# Patient Record
Sex: FEMALE | Race: WHITE | Employment: UNEMPLOYED | URBAN - METROPOLITAN AREA
[De-identification: names, ages, dates, MRNs, and addresses within clinical notes are randomized per-mention and may not be internally consistent; named-entity substitution may affect disease eponyms.]

---

## 2017-10-02 ENCOUNTER — OFFICE VISIT (OUTPATIENT)
Dept: FAMILY MEDICINE CLINIC | Facility: CLINIC | Age: 22
End: 2017-10-02

## 2017-10-02 VITALS
HEART RATE: 80 BPM | DIASTOLIC BLOOD PRESSURE: 60 MMHG | BODY MASS INDEX: 20.37 KG/M2 | HEIGHT: 63 IN | WEIGHT: 115 LBS | TEMPERATURE: 98 F | RESPIRATION RATE: 16 BRPM | SYSTOLIC BLOOD PRESSURE: 118 MMHG

## 2017-10-02 DIAGNOSIS — J01.10 ACUTE NON-RECURRENT FRONTAL SINUSITIS: ICD-10-CM

## 2017-10-02 DIAGNOSIS — J34.89 SINUS PRESSURE: Primary | ICD-10-CM

## 2017-10-02 PROCEDURE — 99213 OFFICE O/P EST LOW 20 MIN: CPT | Performed by: INTERNAL MEDICINE

## 2017-10-02 RX ORDER — AZITHROMYCIN 200 MG/5ML
250 POWDER, FOR SUSPENSION ORAL DAILY
Qty: 50 ML | Refills: 0 | Status: SHIPPED | OUTPATIENT
Start: 2017-10-02 | End: 2017-10-09

## 2017-10-02 RX ORDER — IPRATROPIUM BROMIDE 42 UG/1
2 SPRAY, METERED NASAL 4 TIMES DAILY
Qty: 15 ML | Refills: 0 | Status: SHIPPED | OUTPATIENT
Start: 2017-10-02 | End: 2018-02-27

## 2017-10-02 RX ORDER — LORATADINE AND PSEUDOEPHEDRINE 10; 240 MG/1; MG/1
1 TABLET, EXTENDED RELEASE ORAL DAILY
Qty: 10 TABLET | Refills: 1 | Status: SHIPPED
Start: 2017-10-02 | End: 2018-02-27

## 2017-10-02 NOTE — PROGRESS NOTES
[facility] Internal Medicine Office Note  Chief Complaint:   Patient presents with:   Body ache and/or chills  Ear Pain  Sinus Problem  Cough      HPI:   This is a 23year old female coming in for  HPI  Fiance sick, now cough body aches sore throa reviewed. Appears stated age, well groomed. With brown hair  Physical Exam    Constitutional: She appears well-developed and well-nourished. HENT:   Post nasal drip, +cobblestoning and TM bulging     Cardiovascular: Normal rate. No murmur heard.   Pul Patient verbalizes understanding. Patient is notified to call with any questions, complications, allergies, or worsening or changing symptoms. Patient is to call with any side effects or complications from the treatments as a result of today.    Patient Act

## 2017-10-27 NOTE — TELEPHONE ENCOUNTER
Requesting paroxetine   LOV: 10/2/17 (acute)  Last non acute: 5/2/17  RTC: 6 mo   Last Labs: n/a   Filled: 5/2/17 #90 with 1 refills    No future appointments. Patient is due for OV can we reach out to patient to schedule.

## 2017-11-03 RX ORDER — PAROXETINE 10 MG/1
TABLET, FILM COATED ORAL
Qty: 30 TABLET | Refills: 0 | Status: SHIPPED | OUTPATIENT
Start: 2017-11-03 | End: 2017-11-08

## 2017-11-08 NOTE — PATIENT INSTRUCTIONS
Thank you for choosing Aravind Mchugh PA-C at [place] [address]  To Do: Jessa Lau  1. Begin medications as prescribed  2. Continue medications as prescribed  3.  Follow-up in 6 months, sooner if problems  Effective 6/19/17 until November 2017  Du today.  All therapies have potential risk of harm or side effects or medication interactions.  It is your duty and for your safety to discuss with the pharmacist and our office with questions, and to notify us and stop treatment if problems arise, but know

## 2017-11-08 NOTE — PROGRESS NOTES
[facility] Internal Medicine Progress Note    CC:  Patient presents with:  Medication Follow-Up      HPI:   Medication Follow-Up   Associated symptoms include congestion and coughing.  Pertinent negatives include no chest pain, chills, fever, naus Constitutional: She is oriented to person, place, and time and well-developed, well-nourished, and in no distress. HENT:   Nose: Nose normal.   Mouth/Throat: Oropharynx is clear and moist. No oropharyngeal exudate.    + clear PND  + serous fluid behind Consults:  None     Patient/Caregiver Education: Patient/Caregiver Education: There are no barriers to learning. Medical education done. Outcome: Patient verbalizes understanding.     Problem List:  Patient Active Problem List:     PAMELA (generalized anxiety

## 2018-02-27 ENCOUNTER — OFFICE VISIT (OUTPATIENT)
Dept: FAMILY MEDICINE CLINIC | Facility: CLINIC | Age: 23
End: 2018-02-27

## 2018-02-27 VITALS
HEIGHT: 64 IN | SYSTOLIC BLOOD PRESSURE: 126 MMHG | OXYGEN SATURATION: 98 % | WEIGHT: 124 LBS | TEMPERATURE: 99 F | HEART RATE: 88 BPM | BODY MASS INDEX: 21.17 KG/M2 | RESPIRATION RATE: 18 BRPM | DIASTOLIC BLOOD PRESSURE: 82 MMHG

## 2018-02-27 DIAGNOSIS — H69.82 DYSFUNCTION OF LEFT EUSTACHIAN TUBE: ICD-10-CM

## 2018-02-27 DIAGNOSIS — H60.502 ACUTE OTITIS EXTERNA OF LEFT EAR, UNSPECIFIED TYPE: Primary | ICD-10-CM

## 2018-02-27 PROCEDURE — 99213 OFFICE O/P EST LOW 20 MIN: CPT | Performed by: FAMILY MEDICINE

## 2018-02-27 RX ORDER — NEOMYCIN SULFATE, POLYMYXIN B SULFATE, HYDROCORTISONE 3.5; 10000; 1 MG/ML; [USP'U]/ML; MG/ML
SOLUTION/ DROPS AURICULAR (OTIC)
Qty: 10 ML | Refills: 0 | Status: SHIPPED | OUTPATIENT
Start: 2018-02-27 | End: 2018-03-27

## 2018-02-27 RX ORDER — MOMETASONE 50 UG/1
2 SPRAY, METERED NASAL DAILY
Qty: 17 G | Refills: 0 | Status: SHIPPED | OUTPATIENT
Start: 2018-02-27 | End: 2018-03-27

## 2018-02-28 NOTE — PROGRESS NOTES
Danyel Oliver is a 23year old female. S:  Patient presents today with the following concerns:  · Ear pain for 2 days in the left ear. Before this had fluid type feeling-when would move her head she felt a \"sloshing\" in the ear.   Thought maybe it wa ear canal with mild swelling. No erythema. TM slightly dull but no erythema. There is tenderness on insertion of the otoscope speculum and tenderness on palpation of the tragus. Pharynx is clear. Nasal turbinates are clear.   EYES:PERRLA, EOMI  NECK: s

## 2018-02-28 NOTE — PATIENT INSTRUCTIONS
Start taking Claritin 10 mg once daily. External Ear Infection (Adult)    External otitis (also called “swimmer’s ear”) is an infection in the ear canal. It is often caused by bacteria or fungus.  It can occur a few days after water gets trapped in the e provider right away if any of these occur:  · Ear pain becomes worse or doesn’t improve after 3 days of treatment  · Redness or swelling of the outer ear occurs or gets worse  · Headache  · Painful or stiff neck  · Drowsiness or confusion  · Fever of 100.4 middle ear fluid can become infected, it is important to watch for signs of an ear infection which may develop later. These signs include increased ear pain, fever, or drainage from the ear.   Home care  The following guidelines will help you care for yours

## 2018-03-02 ENCOUNTER — TELEPHONE (OUTPATIENT)
Dept: FAMILY MEDICINE CLINIC | Facility: CLINIC | Age: 23
End: 2018-03-02

## 2018-03-02 DIAGNOSIS — Z01.00 ENCOUNTER FOR ROUTINE EYE AND VISION EXAMINATION: Primary | ICD-10-CM

## 2018-03-02 NOTE — TELEPHONE ENCOUNTER
Patient is trying to get eye exam with optometrist Dr. Mary Alice Delgado    Requesting Referral  LOV: 11/8/17  No future appointments. Patient last saw you and we have not referred there before.   Will you sign please

## 2018-03-02 NOTE — TELEPHONE ENCOUNTER
Contacted pt and informed of referral being placed, pt will contact Dr Ira Bauman to schedule appt, advised that referral dept will contact the pt when approved by insurance

## 2018-03-27 ENCOUNTER — OFFICE VISIT (OUTPATIENT)
Dept: FAMILY MEDICINE CLINIC | Facility: CLINIC | Age: 23
End: 2018-03-27

## 2018-03-27 VITALS
WEIGHT: 124.13 LBS | RESPIRATION RATE: 16 BRPM | HEART RATE: 72 BPM | DIASTOLIC BLOOD PRESSURE: 62 MMHG | BODY MASS INDEX: 21.99 KG/M2 | SYSTOLIC BLOOD PRESSURE: 120 MMHG | HEIGHT: 63 IN | TEMPERATURE: 98 F

## 2018-03-27 DIAGNOSIS — Z76.89 ENCOUNTER TO ESTABLISH CARE WITH NEW DOCTOR: Primary | ICD-10-CM

## 2018-03-27 DIAGNOSIS — Z30.09 CONTRACEPTIVE EDUCATION: ICD-10-CM

## 2018-03-27 DIAGNOSIS — F41.1 GAD (GENERALIZED ANXIETY DISORDER): ICD-10-CM

## 2018-03-27 DIAGNOSIS — R35.0 URINARY FREQUENCY: ICD-10-CM

## 2018-03-27 DIAGNOSIS — Z13.31 NEGATIVE DEPRESSION SCREENING: ICD-10-CM

## 2018-03-27 LAB
APPEARANCE: CLEAR
CONTROL LINE PRESENT WITH A CLEAR BACKGROUND (YES/NO): YES YES/NO
MULTISTIX LOT#: NORMAL NUMERIC
PH, URINE: 5.5 (ref 4.5–8)
PREGNANCY TEST, URINE: NEGATIVE
SPECIFIC GRAVITY: 1.03 (ref 1–1.03)
URINE-COLOR: YELLOW
UROBILINOGEN,SEMI-QN: 1 MG/DL (ref 0–1.9)

## 2018-03-27 PROCEDURE — 87086 URINE CULTURE/COLONY COUNT: CPT | Performed by: FAMILY MEDICINE

## 2018-03-27 PROCEDURE — 87088 URINE BACTERIA CULTURE: CPT | Performed by: FAMILY MEDICINE

## 2018-03-27 PROCEDURE — 81003 URINALYSIS AUTO W/O SCOPE: CPT | Performed by: FAMILY MEDICINE

## 2018-03-27 PROCEDURE — 81025 URINE PREGNANCY TEST: CPT | Performed by: FAMILY MEDICINE

## 2018-03-27 PROCEDURE — 99214 OFFICE O/P EST MOD 30 MIN: CPT | Performed by: FAMILY MEDICINE

## 2018-03-27 PROCEDURE — 87186 SC STD MICRODIL/AGAR DIL: CPT | Performed by: FAMILY MEDICINE

## 2018-03-27 NOTE — PROGRESS NOTES
HPI:   Lauri Jiang is a 23year old female that presents to establish care with new PCP. Hx of anxiety. Symptoms are currently well-controlled on paroxetine daily and clonazepam nightly as needed. Patient does take the clonazepam most nights. 21.99 kg/m² as calculated from the following:    Height as of this encounter: 63\". Weight as of this encounter: 124 lb 2 oz. Vital signs reviewed. Appears stated age, well groomed.   Physical Exam:  GEN:  Patient is alert, awake and oriented, well deve with her fiancé but not planning to become pregnant.   Using pull out method for contraception.  -Contraceptive counseling and safe sex counseling done in detail with patient  -If using pullout method would strongly recommend starting prenatal vitamin in ca

## 2018-03-28 RX ORDER — NITROFURANTOIN 25; 75 MG/1; MG/1
100 CAPSULE ORAL 2 TIMES DAILY
Qty: 14 CAPSULE | Refills: 0 | Status: SHIPPED | OUTPATIENT
Start: 2018-03-28 | End: 2018-04-04

## 2018-04-02 NOTE — PROGRESS NOTES
Received immunization report from Dr. Katy Bustillos. Immunizations have been abstracted. Initialed by MD. Nuvia Roca to scan.

## 2018-04-18 ENCOUNTER — TELEPHONE (OUTPATIENT)
Dept: FAMILY MEDICINE CLINIC | Facility: CLINIC | Age: 23
End: 2018-04-18

## 2018-04-18 NOTE — TELEPHONE ENCOUNTER
CLONAZEPAM 0.5MG TABLETS  Will file in chart as: CLONAZEPAM 0.5 MG Oral Tab  TAKE 1 TABLET BY MOUTH TWICE DAILY AS NEEDED FOR ANXIETY       Disp: 60 tablet Refills: 0    Class: Normal Start: 4/18/2018   Documented:2 years ago  Last refill: 3/25/2018    Nick

## 2018-04-19 ENCOUNTER — TELEPHONE (OUTPATIENT)
Dept: FAMILY MEDICINE CLINIC | Facility: CLINIC | Age: 23
End: 2018-04-19

## 2018-04-19 RX ORDER — CLONAZEPAM 0.5 MG/1
0.5 TABLET ORAL NIGHTLY PRN
Qty: 30 TABLET | Refills: 0 | Status: SHIPPED | OUTPATIENT
Start: 2018-04-19 | End: 2018-06-14

## 2018-04-19 RX ORDER — CLONAZEPAM 0.5 MG/1
0.5 TABLET ORAL NIGHTLY PRN
Qty: 30 TABLET | Refills: 0 | Status: CANCELLED | OUTPATIENT
Start: 2018-04-19

## 2018-04-19 NOTE — TELEPHONE ENCOUNTER
Aelx Reyes  Signed  Creation Time: 04/19/2018 2:25 PM         Pt would like a call back from a nurse in regards to refill denial , pt states she was in to  see dr Stevphen Soulier on 3/27/18 for refills why does she have to come back in .  Please advise

## 2018-04-19 NOTE — TELEPHONE ENCOUNTER
Will refill clonazepam for 30 days, she is due for annual wellness exam with pap. Please have her schedule appt.       Falguni Mccollum, DO  Family Medicine

## 2018-04-19 NOTE — TELEPHONE ENCOUNTER
Pt would like a call back from a nurse in regards to refill denial , pt states she was in to  see dr Paty Mclean on 3/27/18 for refills why does she have to come back in .  Please advise

## 2018-04-19 NOTE — TELEPHONE ENCOUNTER
Spoke to [facility] [employer] [facility] [employer] pharmacist at [facility], called in prescription for Clonazepam, pharmacists verbalized understanding with intent to inform pt when ready to be picked up.     Disp Refills Start End    ClonazePAM 0.5 MG Oral Tab 30 tablet 0 4/19/2018

## 2018-04-19 NOTE — TELEPHONE ENCOUNTER
Abril Cheek, DO   [facility] 27 minutes ago (4:27 PM)      She had visit to establish only, I will refill her paroxetine every 6 months no problem.     She is also due for her wellness exam and pap which we did discuss at appt.    Controlled

## 2018-04-19 NOTE — TELEPHONE ENCOUNTER
Attempted to reach pt, no answer, left detailed message informing pt that she is due for her annual wellness visit to please contact the office to schedule an OV. No future appointments.

## 2018-05-11 RX ORDER — PAROXETINE 10 MG/1
TABLET, FILM COATED ORAL
Qty: 90 TABLET | Refills: 0 | Status: SHIPPED | OUTPATIENT
Start: 2018-05-11 | End: 2018-07-12

## 2018-05-11 NOTE — TELEPHONE ENCOUNTER
PAROXETINE 10MG TABLETS  Will file in chart as: PAROXETINE HCL 10 MG Oral Tab  TAKE 1 TABLET BY MOUTH DAILY       Disp: 30 tablet Refills: 0    Class: Normal Start: 5/11/2018   Documented:1 year ago  Last refill: 4/17/2018  LOV:3- w/  to es

## 2018-06-12 RX ORDER — CLONAZEPAM 0.5 MG/1
TABLET ORAL
Qty: 30 TABLET | Refills: 0
Start: 2018-06-12

## 2018-06-12 NOTE — TELEPHONE ENCOUNTER
Requesting Clonazepam 0.5mg   LOV: 3/27/18  RTC: 4 wks  Last Labs: n/a  Filled: 4/19/18 #30with 0 refills    No future appointments.   Dispensed Written Strength Form Quantity Refills Days Supply Provider Pharmacy    CLONAZEPAM 04/22/2018 04/19/2018 0.5 MG

## 2018-06-14 ENCOUNTER — TELEPHONE (OUTPATIENT)
Dept: FAMILY MEDICINE CLINIC | Facility: CLINIC | Age: 23
End: 2018-06-14

## 2018-06-14 NOTE — TELEPHONE ENCOUNTER
Pt came in today for an ov and was turned away due to the wrong insurance. Pt states she was in the office on 3.27.18 and requested her clonazepam and paroxetine be refilled.  I read back to pt from her 3.27.18 that she didn't need refills on any medication

## 2018-06-14 NOTE — TELEPHONE ENCOUNTER
Requesting Clonazepam and Paroxetine (pt states that at the time of her visit she did not need a refill and stated she would call us when she needed a refill).    LOV: 3/27/18  RTC: 4 wks  Last Labs: n/a  Filled: Paroxetine 5/11/18 #90with 0 refills-should

## 2018-06-16 RX ORDER — CLONAZEPAM 0.5 MG/1
0.5 TABLET ORAL NIGHTLY PRN
Qty: 30 TABLET | Refills: 0 | Status: SHIPPED
Start: 2018-06-16 | End: 2018-07-12

## 2018-06-16 NOTE — TELEPHONE ENCOUNTER
Clonazepam refilled for 30 days. No further refills without appt.      Risa Waggoner, DO  Family Medicine

## 2018-06-18 NOTE — TELEPHONE ENCOUNTER
LM for patient Clonazepam faxed this morning to [facility] and paroxetine sent as well. Further asked patient to call back to schedule appointment as she was given #30 day only and must be seen for further. No future appointments.

## 2018-06-27 ENCOUNTER — TELEPHONE (OUTPATIENT)
Dept: FAMILY MEDICINE CLINIC | Facility: CLINIC | Age: 23
End: 2018-06-27

## 2018-07-12 ENCOUNTER — OFFICE VISIT (OUTPATIENT)
Dept: FAMILY MEDICINE CLINIC | Facility: CLINIC | Age: 23
End: 2018-07-12

## 2018-07-12 VITALS
BODY MASS INDEX: 21.97 KG/M2 | DIASTOLIC BLOOD PRESSURE: 76 MMHG | WEIGHT: 124 LBS | SYSTOLIC BLOOD PRESSURE: 112 MMHG | HEART RATE: 76 BPM | HEIGHT: 63 IN | OXYGEN SATURATION: 99 % | RESPIRATION RATE: 16 BRPM

## 2018-07-12 DIAGNOSIS — Z00.00 ROUTINE GENERAL MEDICAL EXAMINATION AT A HEALTH CARE FACILITY: Primary | ICD-10-CM

## 2018-07-12 DIAGNOSIS — F41.1 GAD (GENERALIZED ANXIETY DISORDER): ICD-10-CM

## 2018-07-12 DIAGNOSIS — F41.0 PANIC ATTACKS: ICD-10-CM

## 2018-07-12 PROCEDURE — 99395 PREV VISIT EST AGE 18-39: CPT | Performed by: PHYSICIAN ASSISTANT

## 2018-07-12 PROCEDURE — G0438 PPPS, INITIAL VISIT: HCPCS | Performed by: PHYSICIAN ASSISTANT

## 2018-07-12 RX ORDER — PAROXETINE 10 MG/1
10 TABLET, FILM COATED ORAL
Qty: 90 TABLET | Refills: 0 | Status: SHIPPED | OUTPATIENT
Start: 2018-07-12 | End: 2018-07-12 | Stop reason: CLARIF

## 2018-07-12 RX ORDER — PAROXETINE 10 MG/1
10 TABLET, FILM COATED ORAL
Qty: 90 TABLET | Refills: 3 | Status: SHIPPED | OUTPATIENT
Start: 2018-07-12 | End: 2019-01-11

## 2018-07-12 RX ORDER — CLONAZEPAM 0.5 MG/1
0.5 TABLET ORAL NIGHTLY PRN
Qty: 60 TABLET | Refills: 0 | Status: SHIPPED | OUTPATIENT
Start: 2018-07-12 | End: 2019-01-11

## 2018-07-12 RX ORDER — CLONAZEPAM 0.5 MG/1
0.5 TABLET ORAL 2 TIMES DAILY PRN
Qty: 60 TABLET | Refills: 3 | Status: SHIPPED | OUTPATIENT
Start: 2018-07-12 | End: 2019-01-11

## 2018-07-12 NOTE — PROGRESS NOTES
REASON FOR VISIT:    Jazmin Moon is a 23year old female who presents for an [address]     PAMELA/panic attacks  Pt is doing well on paroxetine and clonazepam  Mood is good  She states it took a good year to feel completely fine, but now she screening varies with age, risk and gender No results found for: LDL, LDLC   Diabetes Screening  If history of high blood pressure or other  risk factors No results found for: A1C  No results found for: GLUCOSE, GLU     Gonorrhea Screening If high risk No denies abdominal pain, denies heartburn  : denies dysuria, vaginal discharge or itching, periods regular   MUSCULOSKELETAL: denies back pain  NEURO: denies headaches  PSYCHE: denies depression, + anxiety + panic attacks  HEMATOLOGIC: denies hx of anemia good  Will continue    Panic attacks  Pt is doing well on clonazepam nightly and sometimes in the morning  She states this is much better for her than the xanax was  Will continue    Other orders  -     ClonazePAM 0.5 MG Oral Tab;  Take 1 tablet (0.5 mg tot

## 2018-07-12 NOTE — PATIENT INSTRUCTIONS
Thank you for choosing Manuel Potter PA-C at [place] [address]  To Do: Bartolo Benjamin  1. Pt to continue medications as prescribed  2. Get labs done  3.  Follow-up in 6 months for medication follow-up and pap smear, sooner if problems    • Please sig informing you that the insurance company approved your testing, please call Central Scheduling at [phone]  Please allow our office 5 business days to contact you regarding any testing results.     Refill policies:   Allow 3 business days for refills; c

## 2018-07-23 ENCOUNTER — TELEPHONE (OUTPATIENT)
Dept: FAMILY MEDICINE CLINIC | Facility: CLINIC | Age: 23
End: 2018-07-23

## 2018-07-23 NOTE — TELEPHONE ENCOUNTER
Pt says she has a question about her recent visit on 7/12/2018. That is all she said. Please call her back. .. Thanks!

## 2018-07-24 NOTE — TELEPHONE ENCOUNTER
Patient states she needs a note to excuse her for her office visit on 7/12/18 as well as the day before because she took off of work accidentally and didn't realize her appointment wasn't until the 12th. Pended in communications if okay.

## 2018-08-22 ENCOUNTER — TELEPHONE (OUTPATIENT)
Dept: FAMILY MEDICINE CLINIC | Facility: CLINIC | Age: 23
End: 2018-08-22

## 2018-08-22 NOTE — TELEPHONE ENCOUNTER
Patient informed okay for note. She will print off my chart. She needs to be off today and tomorrow and will return to work on Friday. Note done.

## 2018-08-22 NOTE — TELEPHONE ENCOUNTER
Patient states she has dosing questions regarding medications. She was told she could talk to Jovanny Sender at the last visit. She would prefer to talk to her, would give me no further information.  I told her I wasn't sure if a general message such as that would
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
## 2018-08-24 ENCOUNTER — TELEPHONE (OUTPATIENT)
Dept: FAMILY MEDICINE CLINIC | Facility: CLINIC | Age: 23
End: 2018-08-24

## 2018-08-24 NOTE — TELEPHONE ENCOUNTER
Pt said her parents will not be home until later tonight and she still has high anxiety and does not want to leave her home. She is asking to extend her note to be off work through today. I have pended the note if you are okay with approving it?   Note ap

## 2018-08-24 NOTE — TELEPHONE ENCOUNTER
Pt states that she spoke to the nurse a day or so ago and she needs to give additional info to update ppw

## 2018-09-12 ENCOUNTER — OFFICE VISIT (OUTPATIENT)
Dept: FAMILY MEDICINE CLINIC | Facility: CLINIC | Age: 23
End: 2018-09-12

## 2018-09-12 VITALS
TEMPERATURE: 98 F | RESPIRATION RATE: 16 BRPM | SYSTOLIC BLOOD PRESSURE: 120 MMHG | DIASTOLIC BLOOD PRESSURE: 72 MMHG | HEART RATE: 70 BPM | OXYGEN SATURATION: 98 % | HEIGHT: 63 IN | WEIGHT: 125 LBS | BODY MASS INDEX: 22.15 KG/M2

## 2018-09-12 DIAGNOSIS — G44.319 ACUTE POST-TRAUMATIC HEADACHE, NOT INTRACTABLE: ICD-10-CM

## 2018-09-12 DIAGNOSIS — M62.838 MUSCLE SPASMS OF NECK: Primary | ICD-10-CM

## 2018-09-12 DIAGNOSIS — M25.572 ACUTE LEFT ANKLE PAIN: ICD-10-CM

## 2018-09-12 PROCEDURE — 99213 OFFICE O/P EST LOW 20 MIN: CPT | Performed by: PHYSICIAN ASSISTANT

## 2018-09-12 NOTE — PROGRESS NOTES
CHIEF COMPLAINT:   Patient presents with:  Neck Pain    HPI:   Geovanna Gray is a 23year old female who is here for complaints of neck pain after falling down stairs yesterday. Pain is left sided and feels \"crampy,\" it is mild and does not radiate.  Pa EXTREMITIES: no cyanosis, clubbing or edema, non tender to palpation w/ normal ROM w/o pain. Left ankle non edematous, no ecchymosis, joint laxity, or pain with ROM. Active ROM intact.     NECK/NEURO: diffusely DTR's are 2+ bilaterally, strength is 5+/5, Muscle spasms often come and go quickly. When a muscle goes into spasm, very gently stretch and massage the muscle. This may help calm the muscle fibers. Then rest the muscle.   Preventing muscle spasms  Although there is little or no evidence that staying

## 2018-09-12 NOTE — PATIENT INSTRUCTIONS
Muscle Spasm  A muscle spasm (also called a cramp) is an involuntary muscle contraction. The muscle tightens quickly and strongly. A hard lump may form in the muscle. Muscle spasms are very painful.  Read on to learn more about muscle spasms and how to tr © 9301-5347 [employer] [address]. [address] [facility], [facility] [place]. All rights reserved. This information is not intended as a substitute for professional medical care. Always follow your healthcare professional's instructions.

## 2018-10-18 RX ORDER — PAROXETINE 10 MG/1
TABLET, FILM COATED ORAL
Qty: 90 TABLET | Refills: 0 | OUTPATIENT
Start: 2018-10-18

## 2018-10-18 NOTE — TELEPHONE ENCOUNTER
Requesting Paroxetine  LOV: 7/12/18  RTC: 6 months  Last Relevant Labs: n/a  Filled: 7/12/18 #90 with 3 refills    No future appointments.     One year sent in July and receipt is confirmed by pharmacy requesting this - denied with note as such

## 2018-10-18 NOTE — TELEPHONE ENCOUNTER
Requesting Paroxetine  LOV: 7/12/18  RTC: 6 months  Last Relevant Labs: n/a  Filled: 7/12/18 #90 with 3 refills    No future appointments.     Receipt confirmed by pharmacy requesting the refill that was sent for a year in July - denied with note as such

## 2018-10-18 NOTE — TELEPHONE ENCOUNTER
Requesting Paroxetine  LOV: 7/12/18  RTC: 6 months  Last Relevant Labs: n/a  Filled: 7/12/18 #90 with 3 refills    No future appointments.     *receipt of one year RX sent in July confirmed by pharmacy requesting refill - denied with note as such

## 2019-01-11 ENCOUNTER — LAB ENCOUNTER (OUTPATIENT)
Dept: LAB | Facility: HOSPITAL | Age: 24
End: 2019-01-11
Attending: FAMILY MEDICINE
Payer: COMMERCIAL

## 2019-01-11 DIAGNOSIS — R53.83 FATIGUE, UNSPECIFIED TYPE: ICD-10-CM

## 2019-01-11 LAB
ALBUMIN SERPL-MCNC: 4.3 G/DL (ref 3.1–4.5)
ALBUMIN/GLOB SERPL: 1.3 {RATIO} (ref 1–2)
ALP LIVER SERPL-CCNC: 52 U/L (ref 37–98)
ALT SERPL-CCNC: 25 U/L (ref 14–54)
ANION GAP SERPL CALC-SCNC: 6 MMOL/L (ref 0–18)
AST SERPL-CCNC: 20 U/L (ref 15–41)
BASOPHILS # BLD AUTO: 0.01 X10(3) UL (ref 0–0.1)
BASOPHILS NFR BLD AUTO: 0.3 %
BILIRUB SERPL-MCNC: 0.5 MG/DL (ref 0.1–2)
BUN BLD-MCNC: 10 MG/DL (ref 8–20)
BUN/CREAT SERPL: 13.7 (ref 10–20)
CALCIUM BLD-MCNC: 9 MG/DL (ref 8.3–10.3)
CHLORIDE SERPL-SCNC: 108 MMOL/L (ref 101–111)
CO2 SERPL-SCNC: 26 MMOL/L (ref 22–32)
CREAT BLD-MCNC: 0.73 MG/DL (ref 0.55–1.02)
EOSINOPHIL # BLD AUTO: 0.02 X10(3) UL (ref 0–0.3)
EOSINOPHIL NFR BLD AUTO: 0.6 %
ERYTHROCYTE [DISTWIDTH] IN BLOOD BY AUTOMATED COUNT: 12.9 % (ref 11.5–16)
GLOBULIN PLAS-MCNC: 3.4 G/DL (ref 2.8–4.4)
GLUCOSE BLD-MCNC: 93 MG/DL (ref 70–99)
HAV AB SERPL IA-ACNC: 282 PG/ML (ref 193–986)
HCT VFR BLD AUTO: 44.5 % (ref 34–50)
HGB BLD-MCNC: 15 G/DL (ref 12–16)
IMMATURE GRANULOCYTE COUNT: 0.01 X10(3) UL (ref 0–1)
IMMATURE GRANULOCYTE RATIO %: 0.3 %
LYMPHOCYTES # BLD AUTO: 1.3 X10(3) UL (ref 0.9–4)
LYMPHOCYTES NFR BLD AUTO: 37.4 %
M PROTEIN MFR SERPL ELPH: 7.7 G/DL (ref 6.4–8.2)
MCH RBC QN AUTO: 28.8 PG (ref 27–33.2)
MCHC RBC AUTO-ENTMCNC: 33.7 G/DL (ref 31–37)
MCV RBC AUTO: 85.4 FL (ref 81–100)
MONOCYTES # BLD AUTO: 0.28 X10(3) UL (ref 0.1–1)
MONOCYTES NFR BLD AUTO: 8 %
NEUTROPHIL ABS PRELIM: 1.86 X10 (3) UL (ref 1.3–6.7)
NEUTROPHILS # BLD AUTO: 1.86 X10(3) UL (ref 1.3–6.7)
NEUTROPHILS NFR BLD AUTO: 53.4 %
OSMOLALITY SERPL CALC.SUM OF ELEC: 289 MOSM/KG (ref 275–295)
PLATELET # BLD AUTO: 200 10(3)UL (ref 150–450)
POTASSIUM SERPL-SCNC: 4 MMOL/L (ref 3.6–5.1)
RBC # BLD AUTO: 5.21 X10(6)UL (ref 3.8–5.1)
RED CELL DISTRIBUTION WIDTH-SD: 39.8 FL (ref 35.1–46.3)
SODIUM SERPL-SCNC: 140 MMOL/L (ref 136–144)
T4 FREE SERPL-MCNC: 1 NG/DL (ref 0.9–1.8)
TSI SER-ACNC: 1.6 MIU/ML (ref 0.35–5.5)
VIT D+METAB SERPL-MCNC: 28.6 NG/ML (ref 30–100)
WBC # BLD AUTO: 3.5 X10(3) UL (ref 4–13)

## 2019-01-11 PROCEDURE — 85025 COMPLETE CBC W/AUTO DIFF WBC: CPT

## 2019-01-11 PROCEDURE — 86376 MICROSOMAL ANTIBODY EACH: CPT

## 2019-01-11 PROCEDURE — 82607 VITAMIN B-12: CPT

## 2019-01-11 PROCEDURE — 84439 ASSAY OF FREE THYROXINE: CPT

## 2019-01-11 PROCEDURE — 80053 COMPREHEN METABOLIC PANEL: CPT

## 2019-01-11 PROCEDURE — 84443 ASSAY THYROID STIM HORMONE: CPT

## 2019-01-11 PROCEDURE — 36415 COLL VENOUS BLD VENIPUNCTURE: CPT

## 2019-01-11 PROCEDURE — 82306 VITAMIN D 25 HYDROXY: CPT

## 2019-01-11 PROCEDURE — 86800 THYROGLOBULIN ANTIBODY: CPT

## 2019-01-11 NOTE — PATIENT INSTRUCTIONS
Thank you for choosing Neda Peterson PA-C at [place] [address]  To Do: Mark Echevarria  1. Pt to continue medications  2. Pt to get lab work done  3.  Follow-up in 6 months, sooner if problems  Benzodiazepene Anxiety/Sleep Medicines are Risky  Benzodi and seizure disorders,” a [facility] recovery network representative tells me, the “possible threat of overusing them is real and with that comes dependency, overdose and the potentiality of death.” - Estela Reilly PHD [employer]  Do [employer] doct office with questions, and to notify us and stop treatment if problems arise, but know that our intention is that the benefits outweigh those potential risks and we strive to make you healthier and to improve your quality of life.     Referrals, Radiology,

## 2019-01-11 NOTE — PROGRESS NOTES
[facility] Internal Medicine Progress Note    CC:  Patient presents with:  Medication Request: clonazepam and paxil  Fatigue: x 2 months  Imm/Inj: declines flu shot      HPI:   HPI  She states she feels like she has no energy constantly   She wan Pupils are equal, round, and reactive to light. Neck: No thyromegaly present. Cardiovascular: Normal rate, regular rhythm and normal heart sounds. Exam reveals no gallop and no friction rub. No murmur heard.   Pulmonary/Chest: Effort normal and breath

## 2019-01-12 LAB
THYROGLOB SERPL-MCNC: <15 U/ML (ref ?–60)
THYROPEROXIDASE AB SERPL-ACNC: <28 U/ML (ref ?–60)

## 2019-01-14 ENCOUNTER — TELEPHONE (OUTPATIENT)
Dept: FAMILY MEDICINE CLINIC | Facility: CLINIC | Age: 24
End: 2019-01-14

## 2019-01-14 DIAGNOSIS — E55.9 VITAMIN D DEFICIENCY: Primary | ICD-10-CM

## 2019-01-14 RX ORDER — ERGOCALCIFEROL 1.25 MG/1
50000 CAPSULE ORAL WEEKLY
Qty: 4 CAPSULE | Refills: 2 | Status: SHIPPED | OUTPATIENT
Start: 2019-01-14 | End: 2019-02-13

## 2019-01-14 NOTE — PROGRESS NOTES
Vitamin B12 is low, pt can get monthly B12 injections x 6 months  Low Vit D, begin 50,000 IU weekly x 12 weeks and then OTC 2,000 IU daily  WBC is a little low, repeat CBC in 1 month

## 2019-03-24 DIAGNOSIS — E55.9 VITAMIN D DEFICIENCY: ICD-10-CM

## 2019-03-25 RX ORDER — ERGOCALCIFEROL 1.25 MG/1
CAPSULE ORAL
Qty: 4 CAPSULE | Refills: 0 | OUTPATIENT
Start: 2019-03-25

## 2019-03-25 NOTE — TELEPHONE ENCOUNTER
Notes recorded by Remedios Tiwari PA-C on 1/13/2019 at 8:55 PM CST  Vitamin B12 is low, pt can get monthly B12 injections x 6 months  Low Vit D, begin 50,000 IU weekly x 12 weeks and then OTC 2,000 IU daily  WBC is a little low, repeat CBC in 1 month

## 2019-07-13 NOTE — LETTER
Date: 10/2/2017    Patient Name: Shun Fontenot          To Whom it may concern: This letter has been written at the patient's request. The above patient was seen at the [facility] for treatment of a medical condition.     This patient ángelu [employer] spouse

## 2019-08-05 NOTE — TELEPHONE ENCOUNTER
Requesting ClonazePAM 0.5 MG Oral Tab  LOV: 1/11/19  RTC: 6 mos  Last Labs: n/a  Filled: 01/11/19#60 with 5 refills-expires after 6 mos    No future appointments.      Per pharmacy last filled: 4/22/19 #60 With 0 refills

## 2019-08-07 RX ORDER — CLONAZEPAM 0.5 MG/1
0.5 TABLET ORAL 2 TIMES DAILY PRN
Qty: 60 TABLET | Refills: 5 | Status: CANCELLED | OUTPATIENT
Start: 2019-08-07

## 2019-08-08 NOTE — TELEPHONE ENCOUNTER
Requesting Clonazepam 0.5mg  LOV: 19  RTC: 6 mos  Last Labs: n/a  Filled: 19 #60with 5 refills (RX )    No future appointments.   -Duplicate request

## 2019-08-08 NOTE — TELEPHONE ENCOUNTER
Approve/Deny set up RX. Pt returned call, scheduled follow up 8/13/19, needs refill as pt only has a few tablets left.

## 2019-08-08 NOTE — TELEPHONE ENCOUNTER
Pt is again requesting the refill request be reviewed. Last OV 1/11/19 Chantell SANTIAGO with request for 6 month follow up. LMOM for pt requesting a return call to schedule follow up.

## 2019-08-08 NOTE — TELEPHONE ENCOUNTER
Pt sent 2nd CRM message inquiring about refill. LMOM for pt requesting a return call, screening for current crisis needed with SI/HI question. Sent MyChart message to pt also.

## 2019-08-09 RX ORDER — CLONAZEPAM 0.5 MG/1
TABLET ORAL
Qty: 60 TABLET | Refills: 0 | Status: SHIPPED
Start: 2019-08-09 | End: 2019-08-13

## 2019-08-13 ENCOUNTER — OFFICE VISIT (OUTPATIENT)
Dept: FAMILY MEDICINE CLINIC | Facility: CLINIC | Age: 24
End: 2019-08-13
Payer: COMMERCIAL

## 2019-08-13 VITALS
TEMPERATURE: 98 F | RESPIRATION RATE: 16 BRPM | WEIGHT: 128 LBS | HEIGHT: 63 IN | DIASTOLIC BLOOD PRESSURE: 64 MMHG | SYSTOLIC BLOOD PRESSURE: 110 MMHG | HEART RATE: 70 BPM | BODY MASS INDEX: 22.68 KG/M2

## 2019-08-13 DIAGNOSIS — Z00.00 ANNUAL PHYSICAL EXAM: Primary | ICD-10-CM

## 2019-08-13 DIAGNOSIS — F41.1 GAD (GENERALIZED ANXIETY DISORDER): ICD-10-CM

## 2019-08-13 PROCEDURE — 99395 PREV VISIT EST AGE 18-39: CPT | Performed by: FAMILY MEDICINE

## 2019-08-13 RX ORDER — CLONAZEPAM 0.5 MG/1
TABLET ORAL
Qty: 60 TABLET | Refills: 3 | Status: SHIPPED | OUTPATIENT
Start: 2019-08-13 | End: 2020-02-26

## 2019-08-13 NOTE — PROGRESS NOTES
Geovanna Gray is a 20year old female that presents for annual physical exam.     Last Pap: none previously, declined today  Hx of abnormal pap: No  STI testing desired: No  Mammogram: N/A  Colonoscopy: N/A  PHQ2: 0  Vaccines: TDAP 8/8/08 - done 2015 at [facility] Days per week: Not on file        Minutes per session: Not on file      Stress: Not on file    Relationships      Social connections:        Talks on phone: Not on file        Gets together: Not on file        Attends [employer] service: Not on file clear  EYES: PERRLA, EOMI, conjunctiva are clear  NECK: supple, no adenopathy, no thyromegaly or palpable nodules  CHEST: no chest tenderness  LUNGS: clear to auscultation  CARDIO: RRR without murmur  GI: +bowel sounds, no masses, HSM or tenderness  MUSCUL

## 2019-08-13 NOTE — PATIENT INSTRUCTIONS
Thank you for allowing me to participate in your care today. I will contact you with any results from today's visit. Lab results are typically available in 2-3 days for blood tests, and 3-5 days for any cultures or Paps.    Please let me know if you hav prediabetes All women with no symptoms who are overweight or obese and have 1 or more other risk factors for diabetes At least every 3 years.  Also, testing for diabetes during pregnancy after the 24th week.    Type 2 diabetes, prediabetes All women diagnos months after the first dose and the third dose given 6 months after the first dose   Influenza (flu) All women in this age group Once a year   Measles, mumps, rubella (MMR) All women in this age group who have no record of these infections or vaccines 1 or not up-to-date on their childhood vaccines should get all appropriate catch-up vaccines recommended by the CDC. Date Last Reviewed: 10/1/2017  © 1414-5105 [employer] [address]. [address] [facility] [place]. All rights reserved.  This info

## 2019-09-25 ENCOUNTER — NURSE ONLY (OUTPATIENT)
Dept: FAMILY MEDICINE CLINIC | Facility: CLINIC | Age: 24
End: 2019-09-25
Payer: COMMERCIAL

## 2019-09-25 VITALS
DIASTOLIC BLOOD PRESSURE: 70 MMHG | HEART RATE: 77 BPM | HEIGHT: 64 IN | WEIGHT: 126 LBS | OXYGEN SATURATION: 98 % | BODY MASS INDEX: 21.51 KG/M2 | RESPIRATION RATE: 16 BRPM | TEMPERATURE: 98 F | SYSTOLIC BLOOD PRESSURE: 102 MMHG

## 2019-09-25 DIAGNOSIS — L03.113 CELLULITIS OF RIGHT UPPER EXTREMITY: Primary | ICD-10-CM

## 2019-09-25 PROCEDURE — 99213 OFFICE O/P EST LOW 20 MIN: CPT | Performed by: PHYSICIAN ASSISTANT

## 2019-09-25 RX ORDER — SULFAMETHOXAZOLE AND TRIMETHOPRIM 800; 160 MG/1; MG/1
1 TABLET ORAL 2 TIMES DAILY
Qty: 20 TABLET | Refills: 0 | Status: SHIPPED | OUTPATIENT
Start: 2019-09-25 | End: 2019-10-05

## 2019-09-25 NOTE — PATIENT INSTRUCTIONS
Warm compresses   Ibuprofen for pain as needed   If increased redness/swelling/fever please follow up or go to ED

## 2019-09-25 NOTE — PROGRESS NOTES
CHIEF COMPLAINT:   Patient presents with:  Swelling: redness at right arm s/s for 3 days. No OTC meds used.   tendor to touch, traveling up arm         HPI:    Jazmin Red is a 30year old female who presents for evaluation of a rash on the right arm fo +surrounding circular erythema present. No swelling/induration noted. No warmth. +TTP. Good sensation. +2 radial pulses. Normal capillary refill   HENT: Head atraumatic, normocephalic. LUNGS: Clear to auscultation bilaterally.   No wheezing, rhonchi, or ra

## 2019-10-30 ENCOUNTER — TELEPHONE (OUTPATIENT)
Dept: FAMILY MEDICINE CLINIC | Facility: CLINIC | Age: 24
End: 2019-10-30

## 2019-10-30 NOTE — TELEPHONE ENCOUNTER
+Increased anxiety at night  +Would like to increase dose for Clonazepam 0.5 mg  - Denies SI/ID  -Symptoms started 2 weeks  -Patient states she has a new job and hours are making her anxiety worse  Would like to also have an sleep aid.  -Has not tried OTC

## 2019-10-30 NOTE — TELEPHONE ENCOUNTER
Patient informed of MD recommendations below, patient verbalized understanding with intent to comply. Offered opportunity to ask questions, all questions were answered.     Future Appointments   Date Time Provider Nataly Emerson   10/31/2019 10:00 AM [place]

## 2019-10-30 NOTE — TELEPHONE ENCOUNTER
We can adjust medications tomorrow, there are few options and I would like to discuss them without just starting her on medications.

## 2019-11-04 NOTE — PROGRESS NOTES
HPI:   Michael Tolliver is a 30year old female that presents for anxiety. Currently taking daily paxil and clonazepam nightly for anxiety/sleep.   Previously taking one tab nightly but past week feels like she has needed 2 tabs of benzo for anxiety, worse at radial pulses b/l. NEURO:  A&Ox3. Mood anxious. Normal thought content and judgement, no SI/HI. ASSESSMENT AND PLAN:      1. PAMELA (generalized anxiety disorder)  - busPIRone HCl 5 MG Oral Tab;  Take 1 tablet (5 mg total) by mouth 2 (two) times rachelle

## 2019-11-04 NOTE — PATIENT INSTRUCTIONS
Continue paroxetine 10 mg daily  Add buspirone 5 mg twice a day for anxiety  Clonazepam as needed for panic attacks  Follow up if not improving      Panic Attack  A panic attack is an extreme fear reaction that comes on for no clear reason.  There is often you to handle. · Notice how your body reacts to stress. Learn to listen to your body signals so that you can take action before the stress becomes severe.   · Try to be aware of what you were doing before the reaction started; this may give you clues to th of weakness or dizziness  · Cough with dark colored sputum (phlegm) or blood  · Fever of 100.4°F (38°C) or higher, or as directed by your healthcare provider  · Swelling, pain, or redness in one leg  · Requests by family or friends for you to seek help for stressor, let the answering machine . · Adapt to some stressors. For example, when starting a new exercise program, instead of focusing on how hard it will be, think how good you will feel.   Date Last Reviewed: 6/1/2018  © 8981-5606 [employer]

## 2019-11-13 ENCOUNTER — OFFICE VISIT (OUTPATIENT)
Dept: FAMILY MEDICINE CLINIC | Facility: CLINIC | Age: 24
End: 2019-11-13
Payer: COMMERCIAL

## 2019-11-13 VITALS
DIASTOLIC BLOOD PRESSURE: 62 MMHG | TEMPERATURE: 98 F | HEIGHT: 63 IN | HEART RATE: 83 BPM | RESPIRATION RATE: 16 BRPM | WEIGHT: 136.19 LBS | OXYGEN SATURATION: 97 % | BODY MASS INDEX: 24.13 KG/M2 | SYSTOLIC BLOOD PRESSURE: 110 MMHG

## 2019-11-13 DIAGNOSIS — R10.9 ABDOMINAL DISCOMFORT: Primary | ICD-10-CM

## 2019-11-13 PROCEDURE — 99213 OFFICE O/P EST LOW 20 MIN: CPT | Performed by: NURSE PRACTITIONER

## 2019-11-14 NOTE — PROGRESS NOTES
CHIEF COMPLAINT:   Patient presents with:  Pain: stomach discomfort         HPI:   Fito Figueroa is a 30year old female who presents for complaints of abdominal \"discomfort. \"  Pt states she feels a \"rolling\" sensation in the lower abdomen.  She states °F (36.9 °C) (Oral)   Resp 16   Ht 63\"   Wt 136 lb 3.2 oz (61.8 kg)   LMP 11/01/2019 (Exact Date)   SpO2 97%   Breastfeeding No   BMI 24.13 kg/m²   GENERAL: well developed, well nourished,in no apparent distress  SKIN: no rashes,no suspicious lesions  HEA

## 2020-02-03 ENCOUNTER — TELEPHONE (OUTPATIENT)
Dept: FAMILY MEDICINE CLINIC | Facility: CLINIC | Age: 25
End: 2020-02-03

## 2020-02-14 NOTE — PROGRESS NOTES
[facility] Internal Medicine Progress Note    CC:  Patient presents with:  Medication Follow-Up: pt feels as thought her current anxiety medications are not working as well as they should any more.   Imm/Inj: declined flu shot      HPI:   HPI  Pt Exam   Constitutional: She is oriented to person, place, and time and well-developed, well-nourished, and in no distress. HENT:   Mouth/Throat: Oropharynx is clear and moist. No oropharyngeal exudate.    Eyes: Pupils are equal, round, and reactive to ligh Outcome: Patient verbalizes understanding.     Problem List:  Patient Active Problem List:     PAMELA (generalized anxiety disorder)     Panic attacks

## 2020-02-14 NOTE — PATIENT INSTRUCTIONS
Thank you for choosing Manuel Potter PA-C at [place] [address]  To Do: Haroon Sin  1. Begin medication as prescribed  2. Continue clonazepam  3. Can come back and get labs done  4.  Follow-up around 3 months, sooner if problems    • Please signup f informing you that the insurance company approved your testing, please call Central Scheduling at [phone]  Please allow our office 5 business days to contact you regarding any testing results.     Refill policies:   Allow 3 business days for refills; c

## 2020-02-23 DIAGNOSIS — F41.1 GAD (GENERALIZED ANXIETY DISORDER): ICD-10-CM

## 2020-02-25 NOTE — TELEPHONE ENCOUNTER
Requesting Clonazepam 0.5mg  LOV: 2/14/2020  RTC: 3 months  Last Relevant Labs: annual labs done 1/11/19  Filled: 8/13/19 #60 with 3 refills    No future appointments.     Per [place] , last dispensed 1/6/2020 #60    Rx pended and routed to provider for approval/denial

## 2020-02-26 RX ORDER — CLONAZEPAM 0.5 MG/1
TABLET ORAL
Qty: 60 TABLET | Refills: 2 | Status: SHIPPED | OUTPATIENT
Start: 2020-02-26 | End: 2020-06-22

## 2020-03-08 ENCOUNTER — OFFICE VISIT (OUTPATIENT)
Dept: FAMILY MEDICINE CLINIC | Facility: CLINIC | Age: 25
End: 2020-03-08
Payer: COMMERCIAL

## 2020-03-08 VITALS
TEMPERATURE: 99 F | DIASTOLIC BLOOD PRESSURE: 70 MMHG | WEIGHT: 137 LBS | HEART RATE: 83 BPM | OXYGEN SATURATION: 98 % | HEIGHT: 63 IN | SYSTOLIC BLOOD PRESSURE: 100 MMHG | BODY MASS INDEX: 24.27 KG/M2 | RESPIRATION RATE: 16 BRPM

## 2020-03-08 DIAGNOSIS — R30.0 DYSURIA: Primary | ICD-10-CM

## 2020-03-08 DIAGNOSIS — R39.9 UTI SYMPTOMS: ICD-10-CM

## 2020-03-08 LAB
GLUCOSE (URINE DIPSTICK): NEGATIVE MG/DL
KETONES (URINE DIPSTICK): NEGATIVE MG/DL
MULTISTIX LOT#: NORMAL NUMERIC
NITRITE, URINE: POSITIVE
PH, URINE: 5.5 (ref 4.5–8)
PROTEIN (URINE DIPSTICK): 30 MG/DL
SPECIFIC GRAVITY: >=1.03 (ref 1–1.03)
URINE-COLOR: YELLOW
UROBILINOGEN,SEMI-QN: 1 MG/DL (ref 0–1.9)

## 2020-03-08 PROCEDURE — 87088 URINE BACTERIA CULTURE: CPT | Performed by: NURSE PRACTITIONER

## 2020-03-08 PROCEDURE — 99213 OFFICE O/P EST LOW 20 MIN: CPT | Performed by: NURSE PRACTITIONER

## 2020-03-08 PROCEDURE — 87186 SC STD MICRODIL/AGAR DIL: CPT | Performed by: NURSE PRACTITIONER

## 2020-03-08 PROCEDURE — 81003 URINALYSIS AUTO W/O SCOPE: CPT | Performed by: NURSE PRACTITIONER

## 2020-03-08 RX ORDER — NITROFURANTOIN 25; 75 MG/1; MG/1
100 CAPSULE ORAL 2 TIMES DAILY
Qty: 14 CAPSULE | Refills: 0 | Status: SHIPPED | OUTPATIENT
Start: 2020-03-08 | End: 2020-03-15

## 2020-03-08 NOTE — PROGRESS NOTES
CHIEF COMPLAINT:   Patient presents with:  Frequency: painful worst for 4 days  Vomiting: on/off, AZO  Nausea: diarrhea      HPI:   Naty Mann is a 30year old female who presents with symptoms of UTI.  Complaining of urinary frequency, urgency, dysuria tenderness.  No bladder distention, or CVAT     Recent Results (from the past 24 hour(s))   URINALYSIS, AUTO, W/O SCOPE    Collection Time: 03/08/20  1:48 PM   Result Value Ref Range    Glucose Urine Negative mg/dL    Bilirubin Small Negative    Ketones, UA do to help prevent bladder infections? Urinate often during the day. You should also urinate after you have sex. If you are a woman, it is important to:   • Keep the area around your vagina clean.    • Wipe from front to back after you go to the bathroo

## 2020-03-10 ENCOUNTER — TELEPHONE (OUTPATIENT)
Dept: FAMILY MEDICINE CLINIC | Facility: CLINIC | Age: 25
End: 2020-03-10

## 2020-03-10 DIAGNOSIS — N39.0 URINARY TRACT INFECTION WITH HEMATURIA, SITE UNSPECIFIED: Primary | ICD-10-CM

## 2020-03-10 DIAGNOSIS — R31.9 URINARY TRACT INFECTION WITH HEMATURIA, SITE UNSPECIFIED: Primary | ICD-10-CM

## 2020-03-10 RX ORDER — SULFAMETHOXAZOLE AND TRIMETHOPRIM 200; 40 MG/5ML; MG/5ML
160 SUSPENSION ORAL 2 TIMES DAILY
Qty: 280 ML | Refills: 0 | Status: SHIPPED | OUTPATIENT
Start: 2020-03-10 | End: 2020-03-17

## 2020-03-10 NOTE — TELEPHONE ENCOUNTER
PT having difficulty swallowing the capsules. Took apart the capsules to ingest contents. Discussed with [facility] pharmacy - not supposed to do this. Will change medication to bactrim liquid which e.coli in her urine culture is sensitive to.  Instructed pat

## 2020-03-20 ENCOUNTER — PATIENT MESSAGE (OUTPATIENT)
Dept: FAMILY MEDICINE CLINIC | Facility: CLINIC | Age: 25
End: 2020-03-20

## 2020-03-21 RX ORDER — PAROXETINE HYDROCHLORIDE 20 MG/1
20 TABLET, FILM COATED ORAL EVERY MORNING
Qty: 90 TABLET | Refills: 0 | Status: SHIPPED | OUTPATIENT
Start: 2020-03-21 | End: 2020-07-19

## 2020-03-21 NOTE — TELEPHONE ENCOUNTER
From: Ursula Salinas  To:  aMrtín Knutson PA-C  Sent: 3/20/2020 3:28 PM CDT  Subject: Prescription Question    Hi,  With all this that is going on I was wondering if you could fill both prescriptions for me just incase places start to close and I run out o

## 2020-04-06 ENCOUNTER — PATIENT MESSAGE (OUTPATIENT)
Dept: FAMILY MEDICINE CLINIC | Facility: CLINIC | Age: 25
End: 2020-04-06

## 2020-04-06 NOTE — TELEPHONE ENCOUNTER
From: Naty Mann  To:  Cary Saucedo PA-C  Sent: 4/6/2020 11:57 AM CDT  Subject: Non-Urgent Medical Question    Luh Webber,  I was curious if I could get a higher dose of clonzapam, I am taking 2 most days and I feel like I'm running out way quicker be

## 2020-04-07 ENCOUNTER — PATIENT MESSAGE (OUTPATIENT)
Dept: FAMILY MEDICINE CLINIC | Facility: CLINIC | Age: 25
End: 2020-04-07

## 2020-04-07 DIAGNOSIS — J01.90 ACUTE SINUSITIS, UNSPECIFIED: ICD-10-CM

## 2020-04-07 DIAGNOSIS — J20.9 ACUTE BRONCHITIS: ICD-10-CM

## 2020-04-07 NOTE — TELEPHONE ENCOUNTER
----- Message from [employer] sent at 4/7/2020  5:23 AM CDT -----  Regarding: Non-Urgent Medical Question  Contact: [phone]  FERNANDO ART [facility],  I take 3 a day, sometimes, but I am needing at least 2 lately to feel okay.  I am okay going up in dose with para

## 2020-04-07 NOTE — TELEPHONE ENCOUNTER
From: Marquita Dubose  To: Janett Pike PA-C  Sent: 4/7/2020 12:05 PM CDT  Subject: Non-Urgent Medical Question    That sounds perfect, and during the day.  I take everything at night and then sometimes I have to take 1-2 clonzapam again during the day be

## 2020-04-08 DIAGNOSIS — J20.9 ACUTE BRONCHITIS: ICD-10-CM

## 2020-04-08 DIAGNOSIS — J01.90 ACUTE SINUSITIS, UNSPECIFIED: ICD-10-CM

## 2020-04-08 RX ORDER — ALBUTEROL SULFATE 90 UG/1
2 AEROSOL, METERED RESPIRATORY (INHALATION) EVERY 4 HOURS PRN
Qty: 1 INHALER | Refills: 0 | Status: SHIPPED | OUTPATIENT
Start: 2020-04-08 | End: 2020-07-19

## 2020-04-08 RX ORDER — ALBUTEROL SULFATE 90 UG/1
AEROSOL, METERED RESPIRATORY (INHALATION)
Qty: 42.5 G | Refills: 0 | OUTPATIENT
Start: 2020-04-08

## 2020-04-08 RX ORDER — PAROXETINE 30 MG/1
30 TABLET, FILM COATED ORAL EVERY MORNING
Qty: 90 TABLET | Refills: 0 | Status: SHIPPED | OUTPATIENT
Start: 2020-04-08 | End: 2020-06-22

## 2020-04-08 NOTE — TELEPHONE ENCOUNTER
Requested Prescriptions     Pending Prescriptions Disp Refills   • ALBUTEROL SULFATE  (90 Base) MCG/ACT Inhalation Aero Soln [Pharmacy Med Name: ALBUTEROL HFA INH (200 PUFFS) 8.5GM] 42.5 g 0     Sig: INHALE 2 PUFFS INTO THE LUNGS EVERY 4 HOURS AS NE

## 2020-05-11 RX ORDER — PAROXETINE HYDROCHLORIDE 20 MG/1
TABLET, FILM COATED ORAL
Qty: 90 TABLET | Refills: 0 | OUTPATIENT
Start: 2020-05-11

## 2020-06-22 ENCOUNTER — OFFICE VISIT (OUTPATIENT)
Dept: FAMILY MEDICINE CLINIC | Facility: CLINIC | Age: 25
End: 2020-06-22
Payer: COMMERCIAL

## 2020-06-22 VITALS
SYSTOLIC BLOOD PRESSURE: 102 MMHG | TEMPERATURE: 98 F | BODY MASS INDEX: 24.8 KG/M2 | WEIGHT: 140 LBS | HEART RATE: 81 BPM | HEIGHT: 63 IN | DIASTOLIC BLOOD PRESSURE: 68 MMHG | RESPIRATION RATE: 14 BRPM | OXYGEN SATURATION: 97 %

## 2020-06-22 DIAGNOSIS — F41.1 GAD (GENERALIZED ANXIETY DISORDER): ICD-10-CM

## 2020-06-22 PROCEDURE — 99213 OFFICE O/P EST LOW 20 MIN: CPT | Performed by: PHYSICIAN ASSISTANT

## 2020-06-22 RX ORDER — PAROXETINE HYDROCHLORIDE 20 MG/1
20 TABLET, FILM COATED ORAL EVERY MORNING
Qty: 90 TABLET | Refills: 0 | Status: CANCELLED | OUTPATIENT
Start: 2020-06-22

## 2020-06-22 RX ORDER — GLUCOSAMINE/D3/BOSWELLIA SERRA 1500MG-400
TABLET ORAL
COMMUNITY

## 2020-06-22 RX ORDER — CLONAZEPAM 0.5 MG/1
0.5 TABLET ORAL 2 TIMES DAILY PRN
Qty: 60 TABLET | Refills: 5 | Status: SHIPPED | OUTPATIENT
Start: 2020-06-22 | End: 2021-01-07

## 2020-06-22 NOTE — PATIENT INSTRUCTIONS
Thank you for choosing Kathleen Ken PA-C at [place] [address]  To Do: Humberto Sin  1. Continue medications  2.  Follow-up in 6 months, sooner if problems    • Please signup for MY CHART, which is electronic access to your record if you have not don Central Scheduling at [phone]  Please allow our office 5 business days to contact you regarding any testing results.     Refill policies:   Allow 3 business days for refills; controlled substances may take longer and must be picked up from the office i

## 2020-06-22 NOTE — PROGRESS NOTES
[facility] Internal Medicine Progress Note    CC:  Patient presents with:  Medication Follow-Up      HPI:   HPI  Pt didn't feel like the increased dosage of the paxil helped much. So she is just taking Paxil 20mg.   She is taking clonazepam 0.5mg kg/m². Physical Exam   Constitutional: She is oriented to person, place, and time and well-developed, well-nourished, and in no distress. HENT:   Mouth/Throat: Oropharynx is clear and moist. No oropharyngeal exudate.    Eyes: Pupils are equal, round, and

## 2020-07-03 RX ORDER — PAROXETINE 10 MG/1
TABLET, FILM COATED ORAL
Qty: 90 TABLET | Refills: 0 | OUTPATIENT
Start: 2020-07-03

## 2020-07-08 RX ORDER — PAROXETINE 30 MG/1
TABLET, FILM COATED ORAL
Qty: 90 TABLET | Refills: 0 | OUTPATIENT
Start: 2020-07-08

## 2020-07-16 DIAGNOSIS — J20.9 ACUTE BRONCHITIS: ICD-10-CM

## 2020-07-16 DIAGNOSIS — J01.90 ACUTE SINUSITIS, UNSPECIFIED: ICD-10-CM

## 2020-07-16 NOTE — TELEPHONE ENCOUNTER
Requested Prescriptions     Pending Prescriptions Disp Refills   • Albuterol Sulfate HFA (VENTOLIN HFA) 108 (90 Base) MCG/ACT Inhalation Aero Soln 1 Inhaler 0     Sig: Inhale 2 puffs into the lungs every 4 (four) hours as needed for Wheezing or Shortness o

## 2020-07-16 NOTE — TELEPHONE ENCOUNTER
Fax received from [facility] requesting script transfer for Paroxetine 20 mg tabs and ventolin inhaler.

## 2020-07-19 RX ORDER — PAROXETINE HYDROCHLORIDE 20 MG/1
20 TABLET, FILM COATED ORAL EVERY MORNING
Qty: 90 TABLET | Refills: 0 | Status: SHIPPED | OUTPATIENT
Start: 2020-07-19 | End: 2020-09-15

## 2020-07-19 RX ORDER — ALBUTEROL SULFATE 90 UG/1
2 AEROSOL, METERED RESPIRATORY (INHALATION) EVERY 4 HOURS PRN
Qty: 1 INHALER | Refills: 0 | Status: SHIPPED | OUTPATIENT
Start: 2020-07-19 | End: 2021-01-07

## 2020-07-30 ENCOUNTER — MOBILE ENCOUNTER (OUTPATIENT)
Dept: FAMILY MEDICINE CLINIC | Facility: CLINIC | Age: 25
End: 2020-07-30

## 2020-07-31 NOTE — PROGRESS NOTES
Patient called the on-call physician today complaining of nausea, irritability, heightened anxiety, loose stools. She denies fever, suicidal or homicidal thoughts, blood in the stool, chills, coughing, UTI symptoms.   She reports that her Paxil dose got de

## 2020-08-13 ENCOUNTER — PATIENT MESSAGE (OUTPATIENT)
Dept: FAMILY MEDICINE CLINIC | Facility: CLINIC | Age: 25
End: 2020-08-13

## 2020-08-14 NOTE — TELEPHONE ENCOUNTER
From: Paz Alfaro  To: Joyce Lopez PA-C  Sent: 8/13/2020 11:33 PM CDT  Subject: Prescription Question    Hi,  I was curious if I would be able to go on a birth control? Preferably a pill. Let me know what I would have to do to have this done.   Thank

## 2020-08-20 ENCOUNTER — OFFICE VISIT (OUTPATIENT)
Dept: FAMILY MEDICINE CLINIC | Facility: CLINIC | Age: 25
End: 2020-08-20
Payer: COMMERCIAL

## 2020-08-20 VITALS
HEART RATE: 86 BPM | OXYGEN SATURATION: 98 % | BODY MASS INDEX: 25.69 KG/M2 | WEIGHT: 145 LBS | TEMPERATURE: 98 F | SYSTOLIC BLOOD PRESSURE: 116 MMHG | HEIGHT: 63 IN | DIASTOLIC BLOOD PRESSURE: 59 MMHG

## 2020-08-20 DIAGNOSIS — N39.0 URINARY TRACT INFECTION WITHOUT HEMATURIA, SITE UNSPECIFIED: Primary | ICD-10-CM

## 2020-08-20 LAB
APPEARANCE: CLEAR
BILIRUBIN: NEGATIVE
GLUCOSE (URINE DIPSTICK): NEGATIVE MG/DL
MULTISTIX LOT#: ABNORMAL NUMERIC
NITRITE, URINE: NEGATIVE
PH, URINE: 5.5 (ref 4.5–8)
PROTEIN (URINE DIPSTICK): 30 MG/DL
SPECIFIC GRAVITY: >1.03 (ref 1–1.03)
UROBILINOGEN,SEMI-QN: 1 MG/DL (ref 0–1.9)

## 2020-08-20 PROCEDURE — 3078F DIAST BP <80 MM HG: CPT | Performed by: PHYSICIAN ASSISTANT

## 2020-08-20 PROCEDURE — 3074F SYST BP LT 130 MM HG: CPT | Performed by: PHYSICIAN ASSISTANT

## 2020-08-20 PROCEDURE — 87086 URINE CULTURE/COLONY COUNT: CPT | Performed by: NURSE PRACTITIONER

## 2020-08-20 PROCEDURE — 99213 OFFICE O/P EST LOW 20 MIN: CPT | Performed by: PHYSICIAN ASSISTANT

## 2020-08-20 PROCEDURE — 87186 SC STD MICRODIL/AGAR DIL: CPT | Performed by: NURSE PRACTITIONER

## 2020-08-20 PROCEDURE — 3008F BODY MASS INDEX DOCD: CPT | Performed by: PHYSICIAN ASSISTANT

## 2020-08-20 PROCEDURE — 87088 URINE BACTERIA CULTURE: CPT | Performed by: NURSE PRACTITIONER

## 2020-08-20 PROCEDURE — 81003 URINALYSIS AUTO W/O SCOPE: CPT | Performed by: PHYSICIAN ASSISTANT

## 2020-08-20 RX ORDER — NITROFURANTOIN 25; 75 MG/1; MG/1
100 CAPSULE ORAL 2 TIMES DAILY
Qty: 14 CAPSULE | Refills: 0 | Status: SHIPPED | OUTPATIENT
Start: 2020-08-20 | End: 2020-08-27

## 2020-08-20 NOTE — PATIENT INSTRUCTIONS
Patient Declined AVS    Verbal Instructions given      1. Macrobid  2. Urine culture sent  3. Increase fluids  4. Follow up with PCP  5.  If worsening symptoms seek treatment

## 2020-08-20 NOTE — PROGRESS NOTES
CHIEF COMPLAINT:   Patient presents with:  UTI      HPI:   Ursula Salinas is a 30year old female who presents with symptoms of UTI. The patient reports urinary frequency, urgency, and dysuria for last 2 days. Symptoms have been worsening since onset.   Ass 08/01/2020 (Exact Date)   SpO2 98%   BMI 25.69 kg/m²   GENERAL: well developed, well nourished,in no apparent distress  CARDIO: RRR, no murmurs  LUNGS: clear to ausculation bilaterally, no wheezing or rhonchi  GI: BS present x 4. No hepatosplenomegaly.   Louis Mantilla if fever, vomiting, worsening symptoms.

## 2020-08-30 ENCOUNTER — APPOINTMENT (OUTPATIENT)
Dept: GENERAL RADIOLOGY | Age: 25
End: 2020-08-30
Attending: PHYSICIAN ASSISTANT
Payer: COMMERCIAL

## 2020-08-30 ENCOUNTER — HOSPITAL ENCOUNTER (EMERGENCY)
Age: 25
Discharge: HOME | End: 2020-08-30
Attending: EMERGENCY MEDICINE
Payer: COMMERCIAL

## 2020-08-30 VITALS
HEIGHT: 64 IN | TEMPERATURE: 98 F | BODY MASS INDEX: 24.75 KG/M2 | SYSTOLIC BLOOD PRESSURE: 130 MMHG | RESPIRATION RATE: 18 BRPM | WEIGHT: 145 LBS | OXYGEN SATURATION: 99 % | DIASTOLIC BLOOD PRESSURE: 89 MMHG | HEART RATE: 95 BPM

## 2020-08-30 DIAGNOSIS — S61.551A DOG BITE OF RIGHT WRIST, INITIAL ENCOUNTER: Primary | ICD-10-CM

## 2020-08-30 DIAGNOSIS — S61.552A DOG BITE OF LEFT WRIST, INITIAL ENCOUNTER: ICD-10-CM
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
## 2020-08-30 PROCEDURE — 73090 X-RAY EXAM OF FOREARM: CPT | Performed by: PHYSICIAN ASSISTANT

## 2020-08-30 PROCEDURE — 99283 EMERGENCY DEPT VISIT LOW MDM: CPT

## 2020-08-30 RX ORDER — DOXYCYCLINE HYCLATE 100 MG
100 TABLET ORAL 2 TIMES DAILY
Qty: 14 TABLET | Refills: 0 | Status: SHIPPED | OUTPATIENT
Start: 2020-08-30 | End: 2020-09-06

## 2020-08-30 RX ORDER — CLINDAMYCIN HYDROCHLORIDE 300 MG/1
300 CAPSULE ORAL 3 TIMES DAILY
Qty: 30 CAPSULE | Refills: 0 | Status: SHIPPED | OUTPATIENT
Start: 2020-08-30 | End: 2020-09-06

## 2020-08-30 NOTE — ED PROVIDER NOTES
Patient Seen in: [facility] Emergency Department In [place]      History   Patient presents with:  Bite    Stated Complaint: Per pt \"bit by dog last night\" left and right wrist     HPI    28-year-old female who comes in today complaining of a dog bite t rhythm. Heart sounds: Normal heart sounds. Pulmonary:      Effort: Pulmonary effort is normal. No respiratory distress. Breath sounds: Normal breath sounds. No wheezing or rales.    Musculoskeletal:      Right wrist: She exhibits tenderness, swe Isauro Freeman MD on 8/30/2020 at 1:57 PM     Finalized by (CST): Isauro Freeman MD on 8/30/2020 at 1:58 PM           MDM     Discussed with and evaluated the patient with Dr. Radha Morrison who agrees with assessment and plan.     A sepsis was completed, x-rays with her doctor- Nick Landeros DO  - as instructed. The patient verbalized understanding of the discharge instructions and plan.

## 2020-08-30 NOTE — ED NOTES
I reviewe patient states that the dog bite to the right forearm d that chart and discussed the case. I have examined the patient and noted patient complains of a dog bite to the right forearm,, left forearm. .  Onset yesterday.   She states she has mild other acute bony process. Dictated by (CST): Janet Newell MD on 8/30/2020 at 1:57 PM     Finalized by (CST): Janet Newell MD on 8/30/2020 at 1:58 PM     Wounds were cleaned by our staff.   I discussed with the patient the importance of close follow-u

## 2020-09-15 RX ORDER — PAROXETINE HYDROCHLORIDE 20 MG/1
TABLET, FILM COATED ORAL
Qty: 90 TABLET | Refills: 0 | Status: SHIPPED | OUTPATIENT
Start: 2020-09-15 | End: 2020-11-11

## 2020-09-17 ENCOUNTER — VIRTUAL PHONE E/M (OUTPATIENT)
Dept: FAMILY MEDICINE CLINIC | Facility: CLINIC | Age: 25
End: 2020-09-17
Payer: COMMERCIAL

## 2020-09-17 DIAGNOSIS — R30.0 DYSURIA: Primary | ICD-10-CM

## 2020-09-17 PROCEDURE — 99213 OFFICE O/P EST LOW 20 MIN: CPT | Performed by: FAMILY MEDICINE

## 2020-09-17 RX ORDER — NITROFURANTOIN 25; 75 MG/1; MG/1
100 CAPSULE ORAL 2 TIMES DAILY
Qty: 14 CAPSULE | Refills: 0 | Status: SHIPPED | OUTPATIENT
Start: 2020-09-17 | End: 2020-09-24

## 2020-09-17 NOTE — PROGRESS NOTES
Virtual/Telephone Check-In    July Sin verbally consents to a Virtual/Telephone Check-In service on 09/17/20. Patient understands and accepts financial responsibility for any deductible, co-insurance and/or co-pays associated with this service.     D OF SYSTEMS:     Comprehensive ROS negative unless noted in HPI    PHYSICAL EXAM:     GEN:  Patient is alert, awake and oriented, in no apparent distress, speaking in full sentences  SKIN: pt denies any rash  HEENT: pt denies any dry mouth, no audible conge

## 2020-10-27 ENCOUNTER — TELEPHONE (OUTPATIENT)
Dept: FAMILY MEDICINE CLINIC | Facility: CLINIC | Age: 25
End: 2020-10-27

## 2020-10-27 ENCOUNTER — PATIENT MESSAGE (OUTPATIENT)
Dept: FAMILY MEDICINE CLINIC | Facility: CLINIC | Age: 25
End: 2020-10-27

## 2020-10-27 NOTE — TELEPHONE ENCOUNTER
Future Appointments   Date Time Provider Natayl Emerson   10/28/2020 12:20 PM Matt Renner PA-C [facility] [address] [facility] [address]     Patient verbally consents to a virtual/telephone check-in service for the date and time noted above.  Patient understands and

## 2020-10-27 NOTE — TELEPHONE ENCOUNTER
From: Lamar Leventhal  To: Rajendra Oseguera PA-C  Sent: 10/27/2020 10:19 AM CDT  Subject: Non-Urgent Medical Question    Hi,  I was curious if I am able to get a medical note to not have to get the flu shot.  In the past, when I have gotten the flu shot I hav

## 2020-10-28 NOTE — PROGRESS NOTES
This visit is conducted using Telemedicine with live, interactive video and audio. Patient has been referred to the [facility] website at [email or web address] to review the yearly Consent to Treat document.     Patient understands and accepts financial res tablet, Rfl: 5    No current facility-administered medications on file prior to visit. Review of Systems :  Review of Systems   Constitutional: Negative for chills, fatigue and fever. Respiratory: Negative for cough and shortness of breath.     Card

## 2020-11-11 RX ORDER — PAROXETINE HYDROCHLORIDE 20 MG/1
TABLET, FILM COATED ORAL
Qty: 90 TABLET | Refills: 1 | Status: SHIPPED | OUTPATIENT
Start: 2020-11-11 | End: 2021-01-07

## 2020-11-11 NOTE — TELEPHONE ENCOUNTER
Requested Prescriptions     Pending Prescriptions Disp Refills   • PAROXETINE HCL 20 MG Oral Tab [Pharmacy Med Name: Paroxetine Hydrochloride 20 Mg Tab Solc] 90 tablet 0     Sig: TAKE ONE TABLET BY MOUTH ONE TIME DAILY IN THE MORNING       LOV: 10/28/2020

## 2020-11-30 ENCOUNTER — ORDER TRANSCRIPTION (OUTPATIENT)
Dept: ADMINISTRATIVE | Facility: HOSPITAL | Age: 25
End: 2020-11-30

## 2020-11-30 DIAGNOSIS — Z01.818 PREOP EXAMINATION: ICD-10-CM

## 2020-11-30 DIAGNOSIS — Z11.59 ENCOUNTER FOR SCREENING FOR OTHER VIRAL DISEASES: Primary | ICD-10-CM

## 2021-01-07 ENCOUNTER — TELEMEDICINE (OUTPATIENT)
Dept: FAMILY MEDICINE CLINIC | Facility: CLINIC | Age: 26
End: 2021-01-07

## 2021-01-07 DIAGNOSIS — F41.1 GAD (GENERALIZED ANXIETY DISORDER): ICD-10-CM

## 2021-01-07 PROCEDURE — 99213 OFFICE O/P EST LOW 20 MIN: CPT | Performed by: PHYSICIAN ASSISTANT

## 2021-01-07 RX ORDER — CLONAZEPAM 0.5 MG/1
0.5 TABLET ORAL 2 TIMES DAILY PRN
Qty: 60 TABLET | Refills: 5 | Status: SHIPPED | OUTPATIENT
Start: 2021-01-07 | End: 2021-09-14

## 2021-01-07 RX ORDER — PAROXETINE HYDROCHLORIDE 20 MG/1
20 TABLET, FILM COATED ORAL DAILY
Qty: 90 TABLET | Refills: 1 | Status: SHIPPED | OUTPATIENT
Start: 2021-01-07 | End: 2021-03-11 | Stop reason: DRUGHIGH

## 2021-01-07 RX ORDER — ALBUTEROL SULFATE 90 UG/1
2 AEROSOL, METERED RESPIRATORY (INHALATION) EVERY 4 HOURS PRN
Qty: 1 INHALER | Refills: 0 | Status: SHIPPED | OUTPATIENT
Start: 2021-01-07 | End: 2021-08-05

## 2021-01-07 NOTE — PROGRESS NOTES
This visit is conducted using Telemedicine with live, interactive video and audio. Patient has been referred to the [facility] website at [email or web address] to review the yearly Consent to Treat document.     Patient understands and accepts financial res or weight on file to calculate BMI. Physical Exam   Constitutional: She is oriented to person, place, and time and well-developed, well-nourished, and in no distress. HENT:   Head: Normocephalic and atraumatic.    Pulmonary/Chest:   No increased effort o

## 2021-03-09 ENCOUNTER — TELEMEDICINE (OUTPATIENT)
Dept: TELEHEALTH | Age: 26
End: 2021-03-09

## 2021-03-09 DIAGNOSIS — B34.9 VIRAL ILLNESS: Primary | ICD-10-CM

## 2021-03-09 PROCEDURE — 99212 OFFICE O/P EST SF 10 MIN: CPT | Performed by: NURSE PRACTITIONER

## 2021-03-09 NOTE — PATIENT INSTRUCTIONS
Viral Syndrome (Adult)  A viral illness may cause a number of symptoms such as fever. Other symptoms depend on the part of the body that the virus affects.  If it settles in your nose, throat, and lungs, it may cause cough, sore throat, congestion, runny how much and what types of fluids you should drink to prevent dehydration. If you have kidney disease, drinking too much fluid can cause it build up in the your body and be dangerous to your health.   · Over-the-counter remedies won't shorten the length of

## 2021-03-09 NOTE — PROGRESS NOTES
Fernando Esquivel is a 30year old female. HPI:   Patient presents with: Other: fatigue, body aches, chills, sore throat    Encounter was conducted by video visit. Patient has been ill for about a week.  Her symptoms include chills, body aches, fatigue and so chest pain, or palpitations  LUNGS: Denies shortness of breath, cough, or wheezing  GI: Denies abdominal pain, N/V/C/D.   MUSCULOSKELETAL: body aches  LYMPH:  Denies lymphadenopathy  NEURO: Denies headaches or lightheadedness      EXAM:   LMP 08/01/2020 (E headache, unless another medicine was prescribed for this. If you have chronic liver or kidney disease or ever had a stomach ulcer or gastrointestinal bleeding, talk with your healthcare provider before using these medicines.  No one who is younger than 25 you are going to faint  · Extreme thirst  · Fever of 100.4°F (38°C) or higher, or as directed by your healthcare provider  Adriel last reviewed this educational content on 4/1/2018  © 5636-4892 [employer]. All rights reserved.  This informa

## 2021-03-11 ENCOUNTER — TELEPHONE (OUTPATIENT)
Dept: FAMILY MEDICINE CLINIC | Facility: CLINIC | Age: 26
End: 2021-03-11

## 2021-03-11 NOTE — PROGRESS NOTES
This visit is conducted using Telemedicine with live, interactive video and audio. Patient has been referred to the [facility] website at [email or web address] to review the yearly Consent to Treat document.     Patient understands and accepts financial res Respiratory: Negative for cough and shortness of breath. Cardiovascular: Negative for chest pain. Gastrointestinal: Negative for nausea and vomiting.    Psychiatric/Behavioral: Negative for dysphoric mood, self-injury, sleep disturbance and suicidal

## 2021-03-11 NOTE — TELEPHONE ENCOUNTER
Future Appointments   Date Time Provider Nataly Idania   3/11/2021  3:00 PM Leana Mendez PA-C [facility] [address] [facility] [address]     Patient verbally consents to a virtual/telephone check-in service for the date and time noted above.  Patient understands and a

## 2021-04-07 NOTE — PATIENT INSTRUCTIONS
Thank you for choosing Kourtney Moore PA-C at [place] [address]  To Do: Lulu Sin  1. Begin medications as prescribed  2. Carpal Tunnel night wrist splints  3.  Follow-up if symptoms persist or increase    • Please signup for MY CHART, which is joann company approved your testing, please call Central Scheduling at [phone]  Please allow our office 5 business days to contact you regarding any testing results.     Refill policies:   Allow 3 business days for refills; controlled substances may take gurvinder

## 2021-04-07 NOTE — PROGRESS NOTES
[facility] Internal Medicine Progress Note    CC:  Patient presents with:  Wrist Pain: Bilateral wrist pain x 1 month      HPI:   HPI  About 1 month ago both wrists have been painful  Went to the chiropractor the other day and that didn't help  P prior to visit. Review of Systems :  Review of Systems   Constitutional: Negative for chills, fatigue and fever. Respiratory: Negative for cough and shortness of breath. Cardiovascular: Negative for chest pain.    Gastrointestinal: Negative for na to stay away from steroid  Will start naproxen as needed  Night time wrist splints  Stretching exercises discussed    Acne, unspecified acne type  Begin medication as prescribed nightly     RTC if symptoms persist or increase  No orders of the defined type

## 2021-04-26 ENCOUNTER — HOSPITAL ENCOUNTER (EMERGENCY)
Age: 26
Discharge: HOME | End: 2021-04-26
Payer: COMMERCIAL

## 2021-04-26 ENCOUNTER — APPOINTMENT (OUTPATIENT)
Dept: GENERAL RADIOLOGY | Age: 26
End: 2021-04-26
Payer: COMMERCIAL

## 2021-04-26 VITALS
HEIGHT: 64 IN | TEMPERATURE: 98 F | HEART RATE: 98 BPM | RESPIRATION RATE: 16 BRPM | BODY MASS INDEX: 21.34 KG/M2 | OXYGEN SATURATION: 100 % | DIASTOLIC BLOOD PRESSURE: 88 MMHG | WEIGHT: 125 LBS | SYSTOLIC BLOOD PRESSURE: 130 MMHG

## 2021-04-26 DIAGNOSIS — S62.115A CLOSED NONDISPLACED FRACTURE OF TRIQUETRUM OF LEFT WRIST, INITIAL ENCOUNTER: Primary | ICD-10-CM

## 2021-04-26 PROCEDURE — 29125 APPL SHORT ARM SPLINT STATIC: CPT | Performed by: EMERGENCY MEDICINE

## 2021-04-26 PROCEDURE — 73110 X-RAY EXAM OF WRIST: CPT

## 2021-04-26 PROCEDURE — 99283 EMERGENCY DEPT VISIT LOW MDM: CPT | Performed by: EMERGENCY MEDICINE

## 2021-04-27 ENCOUNTER — TELEPHONE (OUTPATIENT)
Dept: ORTHOPEDICS CLINIC | Facility: CLINIC | Age: 26
End: 2021-04-27

## 2021-04-27 ENCOUNTER — TELEPHONE (OUTPATIENT)
Dept: FAMILY MEDICINE CLINIC | Facility: CLINIC | Age: 26
End: 2021-04-27

## 2021-04-27 ENCOUNTER — OFFICE VISIT (OUTPATIENT)
Dept: FAMILY MEDICINE CLINIC | Facility: CLINIC | Age: 26
End: 2021-04-27
Payer: COMMERCIAL

## 2021-04-27 VITALS
HEART RATE: 102 BPM | HEIGHT: 64 IN | RESPIRATION RATE: 20 BRPM | BODY MASS INDEX: 22.2 KG/M2 | DIASTOLIC BLOOD PRESSURE: 72 MMHG | TEMPERATURE: 98 F | WEIGHT: 130 LBS | OXYGEN SATURATION: 98 % | SYSTOLIC BLOOD PRESSURE: 131 MMHG

## 2021-04-27 DIAGNOSIS — R30.0 DYSURIA: Primary | ICD-10-CM

## 2021-04-27 PROCEDURE — 3075F SYST BP GE 130 - 139MM HG: CPT | Performed by: NURSE PRACTITIONER

## 2021-04-27 PROCEDURE — 99213 OFFICE O/P EST LOW 20 MIN: CPT | Performed by: NURSE PRACTITIONER

## 2021-04-27 PROCEDURE — 81003 URINALYSIS AUTO W/O SCOPE: CPT | Performed by: NURSE PRACTITIONER

## 2021-04-27 PROCEDURE — 87088 URINE BACTERIA CULTURE: CPT | Performed by: NURSE PRACTITIONER

## 2021-04-27 PROCEDURE — 3008F BODY MASS INDEX DOCD: CPT | Performed by: NURSE PRACTITIONER

## 2021-04-27 PROCEDURE — 3078F DIAST BP <80 MM HG: CPT | Performed by: NURSE PRACTITIONER

## 2021-04-27 PROCEDURE — 87086 URINE CULTURE/COLONY COUNT: CPT | Performed by: NURSE PRACTITIONER

## 2021-04-27 PROCEDURE — 87186 SC STD MICRODIL/AGAR DIL: CPT | Performed by: NURSE PRACTITIONER

## 2021-04-27 RX ORDER — PHENAZOPYRIDINE HYDROCHLORIDE 200 MG/1
200 TABLET, FILM COATED ORAL 3 TIMES DAILY PRN
Qty: 15 TABLET | Refills: 0 | Status: SHIPPED | OUTPATIENT
Start: 2021-04-27 | End: 2021-05-02

## 2021-04-27 RX ORDER — NITROFURANTOIN 25; 75 MG/1; MG/1
100 CAPSULE ORAL 2 TIMES DAILY
Qty: 10 CAPSULE | Refills: 0 | Status: SHIPPED | OUTPATIENT
Start: 2021-04-27 | End: 2021-05-02

## 2021-04-27 NOTE — ED INITIAL ASSESSMENT (HPI)
Patient was gardening when she put her shovel into the ground and hyperextending her left wrist back. Complaints of left wrist pain.

## 2021-04-27 NOTE — ED PROVIDER NOTES
Patient Seen in: THE [facility] Emergency Department In [place]      History   Patient presents with:  Arm or Hand Injury    Stated Complaint: left wrist injury s/p gardening and bending it back while using a shovel.     HPI/Subjective:   HPI    57-year-old fe (CPT=73110)    Result Date: 4/26/2021  CONCLUSION:  Findings suspicious for a tiny dorsal triquetral fracture.     Dictated by (CST): Nataliia Linares MD on 4/26/2021 at 9:44 PM     Finalized by (CST): Nataliia Linares MD on 4/26/2021 at 9:49 PM              MDM

## 2021-04-27 NOTE — PATIENT INSTRUCTIONS
· Please start the antibiotic as discussed. We will send a urine culture and advise you if a change is needed in your antibiotic. · You may take Pyridium every 8 hours as needed for bladder spasm/pain.  This will not interfere with your pain medications fo

## 2021-04-27 NOTE — PROGRESS NOTES
Black Coronado is a 30year old female. HPI:   Patient presents with urinary symptoms. Complaining of urinary frequency, urgency, dysuria. Denies back pain, fever, hematuria. Was seen in ER yesterday for left wrist fracture.   Duration: 2 days  Vaginal disch pain on exertion  GI: no nausea or vomiting  : no hematuria  NEURO: denies headaches  MUSCULOSKELETAL:  No CVA tenderness    EXAM:   /72   Pulse 102   Temp 98.2 °F (36.8 °C) (Temporal)   Resp 20   Ht 5' 4\" (1.626 m)   Wt 130 lb (59 kg)   LMP 04/05 spasm/pain. This will not interfere with your pain medications for your wrist, but will turn your urine orange/stain objects. You should not need more than 1-2 doses if antibiotic is helping. · Wipe from front to back after using the bathroom every time.

## 2021-04-27 NOTE — TELEPHONE ENCOUNTER
New patient with wrist fracture, xray is in [facility]. Is Friday appointment okay? Patient would like to be seen before then if possible.  She has been added to the wait list.    Future Appointments   Date Time Provider Nataly Emerson   4/30/2021  8:30 AM Gideon See

## 2021-05-01 ENCOUNTER — HOSPITAL ENCOUNTER (EMERGENCY)
Age: 26
Discharge: HOME | End: 2021-05-02
Attending: EMERGENCY MEDICINE
Payer: COMMERCIAL

## 2021-05-01 DIAGNOSIS — S63.502A SPRAIN OF LEFT WRIST, INITIAL ENCOUNTER: Primary | ICD-10-CM

## 2021-05-01 PROCEDURE — 99283 EMERGENCY DEPT VISIT LOW MDM: CPT

## 2021-05-02 VITALS
WEIGHT: 130.06 LBS | SYSTOLIC BLOOD PRESSURE: 129 MMHG | TEMPERATURE: 98 F | BODY MASS INDEX: 22 KG/M2 | OXYGEN SATURATION: 97 % | DIASTOLIC BLOOD PRESSURE: 81 MMHG | RESPIRATION RATE: 20 BRPM | HEART RATE: 85 BPM

## 2021-05-02 RX ORDER — MORPHINE SULFATE 4 MG/ML
4 INJECTION, SOLUTION INTRAMUSCULAR; INTRAVENOUS ONCE
Status: COMPLETED | OUTPATIENT
Start: 2021-05-02 | End: 2021-05-02

## 2021-05-02 RX ORDER — ONDANSETRON 4 MG/1
4 TABLET, ORALLY DISINTEGRATING ORAL EVERY 4 HOURS PRN
Qty: 20 TABLET | Refills: 0 | Status: SHIPPED | OUTPATIENT
Start: 2021-05-02 | End: 2021-05-07

## 2021-05-02 RX ORDER — OXYCODONE HYDROCHLORIDE AND ACETAMINOPHEN 5; 325 MG/1; MG/1
1-2 TABLET ORAL EVERY 6 HOURS PRN
Qty: 20 TABLET | Refills: 0 | Status: SHIPPED | OUTPATIENT
Start: 2021-05-02 | End: 2021-05-07

## 2021-05-02 NOTE — ED INITIAL ASSESSMENT (HPI)
Patient presents with complaint of intractable left wrist pain. States seen here Monday for left wrist fracture. Discharged with instruction to follow-up with orthopedic surgeon. Appointment completed the following day - sent home on velcro splint.  Now end

## 2021-05-02 NOTE — ED PROVIDER NOTES
Patient Seen in: THE [facility] Emergency Department In [place]      History   Patient presents with:  Arm or Hand Injury: was here Monday/ broken wrist.  Pain no beteer and no cast    Stated Complaint:     HPI/Subjective:   HPI    Patient is a 15-year-old fe radial ulnar pulses left upper extremity. She is unable to extend her left wrist due to pain.      ED Course   Labs Reviewed - No data to display                MDM      Patient is a 76-year-old female comes to emergency room for evaluation of left wrist p

## 2021-09-09 DIAGNOSIS — F41.1 GAD (GENERALIZED ANXIETY DISORDER): ICD-10-CM

## 2021-09-10 NOTE — TELEPHONE ENCOUNTER
Requesting Clonazepam 0.5mg  LOV: 4/7/21  RTC: prn  Last Relevant Labs: 1/11/19  Filled: 1/7/21 #60 with 5 refills    No future appointments.     Rx pended and routed for approval/denial

## 2021-09-12 ENCOUNTER — HOSPITAL ENCOUNTER (EMERGENCY)
Age: 26
Discharge: HOME | End: 2021-09-13
Attending: EMERGENCY MEDICINE
Payer: COMMERCIAL

## 2021-09-12 VITALS
HEART RATE: 80 BPM | HEIGHT: 64 IN | OXYGEN SATURATION: 99 % | BODY MASS INDEX: 23.9 KG/M2 | RESPIRATION RATE: 18 BRPM | TEMPERATURE: 98 F | WEIGHT: 140 LBS | DIASTOLIC BLOOD PRESSURE: 82 MMHG | SYSTOLIC BLOOD PRESSURE: 132 MMHG

## 2021-09-12 DIAGNOSIS — M54.12 CERVICAL RADICULOPATHY: Primary | ICD-10-CM

## 2021-09-12 PROCEDURE — 96375 TX/PRO/DX INJ NEW DRUG ADDON: CPT | Performed by: EMERGENCY MEDICINE

## 2021-09-12 PROCEDURE — 99284 EMERGENCY DEPT VISIT MOD MDM: CPT | Performed by: EMERGENCY MEDICINE

## 2021-09-12 PROCEDURE — 96374 THER/PROPH/DIAG INJ IV PUSH: CPT | Performed by: EMERGENCY MEDICINE

## 2021-09-12 RX ORDER — KETOROLAC TROMETHAMINE 30 MG/ML
30 INJECTION, SOLUTION INTRAMUSCULAR; INTRAVENOUS ONCE
Status: COMPLETED | OUTPATIENT
Start: 2021-09-12 | End: 2021-09-13

## 2021-09-12 RX ORDER — METHYLPREDNISOLONE SODIUM SUCCINATE 125 MG/2ML
125 INJECTION, POWDER, LYOPHILIZED, FOR SOLUTION INTRAMUSCULAR; INTRAVENOUS ONCE
Status: COMPLETED | OUTPATIENT
Start: 2021-09-12 | End: 2021-09-13

## 2021-09-12 RX ORDER — HYDROMORPHONE HYDROCHLORIDE 1 MG/ML
0.5 INJECTION, SOLUTION INTRAMUSCULAR; INTRAVENOUS; SUBCUTANEOUS ONCE
Status: DISCONTINUED | OUTPATIENT
Start: 2021-09-12 | End: 2021-09-13

## 2021-09-12 RX ORDER — DIAZEPAM 5 MG/1
5 TABLET ORAL ONCE
Status: COMPLETED | OUTPATIENT
Start: 2021-09-13 | End: 2021-09-13

## 2021-09-13 ENCOUNTER — TELEPHONE (OUTPATIENT)
Dept: FAMILY MEDICINE CLINIC | Facility: CLINIC | Age: 26
End: 2021-09-13

## 2021-09-13 RX ORDER — HYDROCODONE BITARTRATE AND ACETAMINOPHEN 5; 325 MG/1; MG/1
1-2 TABLET ORAL EVERY 6 HOURS PRN
Qty: 10 TABLET | Refills: 0 | Status: SHIPPED | OUTPATIENT
Start: 2021-09-13 | End: 2021-09-14

## 2021-09-13 RX ORDER — METHYLPREDNISOLONE 4 MG/1
TABLET ORAL
Qty: 21 TABLET | Refills: 0 | Status: SHIPPED | OUTPATIENT
Start: 2021-09-13

## 2021-09-13 NOTE — ED PROVIDER NOTES
Patient Seen in: THE [facility] Emergency Department In [place]      History   Patient presents with:  Pain    Stated Complaint: pt c/o left shoulder blade pain, radiating down arm. no known injury.      Subjective:   HPI    18-year-old female presents to the Oropharynx is clear. Uvula is midline. Tympanic membranes are clear without evidence of injection or perforation. Neck exam: Supple. There is no lymphadenopathy or carotid bruit. Cardiovascular exam: Regular rate and rhythm.   There are no murmurs, rub within reasonable clinical confidence and prior to discharge, that an emergency medical condition was not or was no longer present. There was no indication for further evaluation, treatment or admission on an emergency basis.   Comprehensive verbal and wri

## 2021-09-13 NOTE — ED INITIAL ASSESSMENT (HPI)
Pt presents to ER for evaluation of cervical radiculopathy-type pain going down the left side of her neck to her arm since 7pm, worse to movement.

## 2021-09-14 ENCOUNTER — OFFICE VISIT (OUTPATIENT)
Dept: FAMILY MEDICINE CLINIC | Facility: CLINIC | Age: 26
End: 2021-09-14
Payer: COMMERCIAL

## 2021-09-14 VITALS
HEIGHT: 64 IN | BODY MASS INDEX: 24.59 KG/M2 | TEMPERATURE: 98 F | SYSTOLIC BLOOD PRESSURE: 122 MMHG | HEART RATE: 75 BPM | DIASTOLIC BLOOD PRESSURE: 80 MMHG | RESPIRATION RATE: 16 BRPM | OXYGEN SATURATION: 98 % | WEIGHT: 144 LBS

## 2021-09-14 DIAGNOSIS — F41.1 GAD (GENERALIZED ANXIETY DISORDER): ICD-10-CM

## 2021-09-14 DIAGNOSIS — M25.512 ACUTE PAIN OF LEFT SHOULDER: ICD-10-CM

## 2021-09-14 DIAGNOSIS — M54.12 CERVICAL RADICULOPATHY: ICD-10-CM

## 2021-09-14 DIAGNOSIS — R35.0 URINARY FREQUENCY: Primary | ICD-10-CM

## 2021-09-14 LAB
APPEARANCE: CLEAR
BILIRUBIN: NEGATIVE
GLUCOSE (URINE DIPSTICK): NEGATIVE MG/DL
KETONES (URINE DIPSTICK): NEGATIVE MG/DL
MULTISTIX LOT#: ABNORMAL NUMERIC
NITRITE, URINE: NEGATIVE
PH, URINE: 6 (ref 4.5–8)
PROTEIN (URINE DIPSTICK): NEGATIVE MG/DL
SPECIFIC GRAVITY: 1.02 (ref 1–1.03)
URINE-COLOR: YELLOW
UROBILINOGEN,SEMI-QN: 0.2 MG/DL (ref 0–1.9)

## 2021-09-14 PROCEDURE — 87086 URINE CULTURE/COLONY COUNT: CPT | Performed by: FAMILY MEDICINE

## 2021-09-14 PROCEDURE — 99214 OFFICE O/P EST MOD 30 MIN: CPT | Performed by: FAMILY MEDICINE

## 2021-09-14 PROCEDURE — 3008F BODY MASS INDEX DOCD: CPT | Performed by: FAMILY MEDICINE

## 2021-09-14 PROCEDURE — 81003 URINALYSIS AUTO W/O SCOPE: CPT | Performed by: FAMILY MEDICINE

## 2021-09-14 PROCEDURE — 3074F SYST BP LT 130 MM HG: CPT | Performed by: FAMILY MEDICINE

## 2021-09-14 PROCEDURE — 3079F DIAST BP 80-89 MM HG: CPT | Performed by: FAMILY MEDICINE

## 2021-09-14 RX ORDER — CLONAZEPAM 0.5 MG/1
0.5 TABLET ORAL NIGHTLY PRN
Qty: 30 TABLET | Refills: 5 | Status: SHIPPED | OUTPATIENT
Start: 2021-09-14 | End: 2022-01-19

## 2021-09-14 RX ORDER — CLONAZEPAM 0.5 MG/1
0.5 TABLET ORAL 2 TIMES DAILY PRN
Qty: 60 TABLET | Refills: 5 | Status: CANCELLED | OUTPATIENT
Start: 2021-09-14

## 2021-09-14 RX ORDER — CYCLOBENZAPRINE HCL 10 MG
10 TABLET ORAL NIGHTLY PRN
Qty: 15 TABLET | Refills: 0 | Status: SHIPPED | OUTPATIENT
Start: 2021-09-14

## 2021-09-14 RX ORDER — PAROXETINE 30 MG/1
30 TABLET, FILM COATED ORAL EVERY MORNING
Qty: 90 TABLET | Refills: 1 | Status: CANCELLED | OUTPATIENT
Start: 2021-09-14

## 2021-09-14 RX ORDER — NITROFURANTOIN 25; 75 MG/1; MG/1
100 CAPSULE ORAL 2 TIMES DAILY
Qty: 6 CAPSULE | Refills: 0 | Status: SHIPPED | OUTPATIENT
Start: 2021-09-14 | End: 2021-09-17

## 2021-09-14 RX ORDER — CLONAZEPAM 0.5 MG/1
TABLET ORAL
Qty: 60 TABLET | Refills: 0 | OUTPATIENT
Start: 2021-09-14

## 2021-09-14 RX ORDER — PAROXETINE 30 MG/1
30 TABLET, FILM COATED ORAL NIGHTLY
Qty: 90 TABLET | Refills: 1 | Status: SHIPPED | OUTPATIENT
Start: 2021-09-14 | End: 2022-01-18

## 2021-09-14 NOTE — PROGRESS NOTES
CHIEF COMPLAINT:   Patient presents with:  ER F/U: 9/12/2021- L shoulder pain, she is taking half a norco for pain and is icing at home.   Urinary: believes she has a UTI  Medication Request: Paxil and  clonazepam refill      HPI:   Arik Krishnamurthy is a 30 y spasms. 15 tablet 0   • methylPREDNISolone 4 MG Oral Tablet Therapy Pack Take as directed on packaging 21 tablet 0   • Biotin 10382 MCG Oral Tab Take by mouth. • Cyanocobalamin (B-12 OR) Take by mouth.      • Vitamin D3, Cholecalciferol, 125 MCG (5000 U (generalized anxiety disorder)  Anxiety - at goal, no suicidal or homicidal thoughts. Continue present management. Patient will call if any symptoms worsen.  Patient understands risks that anti-depressants as well as anti-anxiety agents have been shown to p cyclobenzaprine 10 MG Oral Tab; Take 1 tablet (10 mg total) by mouth nightly as needed for Muscle spasms. Dispense: 15 tablet;  Refill: 0    Meds & Refills for this Visit:  Requested Prescriptions     Signed Prescriptions Disp Refills   • PARoxetine 30 MG

## 2021-09-16 PROBLEM — M54.12 CERVICAL RADICULOPATHY: Status: ACTIVE | Noted: 2021-09-16

## 2021-09-16 PROBLEM — R35.0 URINARY FREQUENCY: Status: ACTIVE | Noted: 2021-09-16

## 2021-09-16 PROBLEM — M25.512 ACUTE PAIN OF LEFT SHOULDER: Status: ACTIVE | Noted: 2021-09-16

## 2022-01-17 DIAGNOSIS — F41.1 GAD (GENERALIZED ANXIETY DISORDER): ICD-10-CM

## 2022-01-17 NOTE — TELEPHONE ENCOUNTER
Patient states she thinks her medication dose for Paroxetine from 9/2021 is wrong. She said her last visit with Amita Coppola she tried 30 mg but didn't like it. She thinks when Dr. Anjali Dickens did a refill she did 30 mg but should be 20 mg.  She looked back at her m

## 2022-01-17 NOTE — TELEPHONE ENCOUNTER
See phone message below:    Pt was prescribed Paroxetine 30mg but she never started the increased dose. She feels her anxiety is well controlled on 20mg.      Requesting refill on 20mg

## 2022-01-18 RX ORDER — PAROXETINE HYDROCHLORIDE 20 MG/1
20 TABLET, FILM COATED ORAL DAILY
Qty: 90 TABLET | Refills: 1 | Status: SHIPPED | OUTPATIENT
Start: 2022-01-18 | End: 2022-01-19

## 2022-01-19 RX ORDER — PAROXETINE HYDROCHLORIDE 20 MG/1
20 TABLET, FILM COATED ORAL DAILY
Qty: 90 TABLET | Refills: 1 | Status: SHIPPED | OUTPATIENT
Start: 2022-01-19

## 2022-01-19 RX ORDER — CLONAZEPAM 0.5 MG/1
0.5 TABLET ORAL 2 TIMES DAILY PRN
Qty: 60 TABLET | Refills: 1 | Status: SHIPPED | OUTPATIENT
Start: 2022-01-19

## 2022-01-19 NOTE — TELEPHONE ENCOUNTER
Pt is calling back to see if she can talk to a nurse. She is looking for clarification dosage for the Clonazepam 10mg 3 times a day as needed, and Paroxetine 20mg.

## 2022-01-19 NOTE — TELEPHONE ENCOUNTER
See TE, clonazepam pended for your review and approval/denial.     Last OV 9/14/2021 acute  Upcoming OV 2/22/2022 AWV

## 2022-01-19 NOTE — TELEPHONE ENCOUNTER
Spoke with pt and informed that paroxetine 20 mg has been sent in to the pharmacy for 90 day supply with 1 refill. Pt expressed her appreciation and then clarified that she changed her pharmacy to the [address] [place] [address] at [facility].  Resent Rx to pt's update

## 2022-02-22 ENCOUNTER — OFFICE VISIT (OUTPATIENT)
Dept: FAMILY MEDICINE CLINIC | Facility: CLINIC | Age: 27
End: 2022-02-22
Payer: COMMERCIAL

## 2022-02-22 VITALS
TEMPERATURE: 98 F | DIASTOLIC BLOOD PRESSURE: 62 MMHG | SYSTOLIC BLOOD PRESSURE: 110 MMHG | HEART RATE: 75 BPM | WEIGHT: 150 LBS | BODY MASS INDEX: 25.61 KG/M2 | HEIGHT: 64 IN | OXYGEN SATURATION: 98 % | RESPIRATION RATE: 16 BRPM

## 2022-02-22 DIAGNOSIS — Z12.4 PAP SMEAR FOR CERVICAL CANCER SCREENING: ICD-10-CM

## 2022-02-22 DIAGNOSIS — Z00.00 ANNUAL PHYSICAL EXAM: Primary | ICD-10-CM

## 2022-02-22 DIAGNOSIS — Z28.21 INFLUENZA VACCINATION DECLINED BY PATIENT: ICD-10-CM

## 2022-02-22 DIAGNOSIS — Z11.3 ROUTINE SCREENING FOR STI (SEXUALLY TRANSMITTED INFECTION): ICD-10-CM

## 2022-02-22 DIAGNOSIS — F41.1 GAD (GENERALIZED ANXIETY DISORDER): ICD-10-CM

## 2022-02-22 DIAGNOSIS — Z13.31 NEGATIVE DEPRESSION SCREENING: ICD-10-CM

## 2022-02-22 PROCEDURE — 99395 PREV VISIT EST AGE 18-39: CPT | Performed by: FAMILY MEDICINE

## 2022-02-22 PROCEDURE — 3078F DIAST BP <80 MM HG: CPT | Performed by: FAMILY MEDICINE

## 2022-02-22 PROCEDURE — 3008F BODY MASS INDEX DOCD: CPT | Performed by: FAMILY MEDICINE

## 2022-02-22 PROCEDURE — 88175 CYTOPATH C/V AUTO FLUID REDO: CPT | Performed by: FAMILY MEDICINE

## 2022-02-22 PROCEDURE — 99213 OFFICE O/P EST LOW 20 MIN: CPT | Performed by: FAMILY MEDICINE

## 2022-02-22 PROCEDURE — 3074F SYST BP LT 130 MM HG: CPT | Performed by: FAMILY MEDICINE

## 2022-02-22 PROCEDURE — 87491 CHLMYD TRACH DNA AMP PROBE: CPT | Performed by: FAMILY MEDICINE

## 2022-02-22 PROCEDURE — 87591 N.GONORRHOEAE DNA AMP PROB: CPT | Performed by: FAMILY MEDICINE

## 2022-02-22 RX ORDER — CLONAZEPAM 0.5 MG/1
0.5 TABLET ORAL 2 TIMES DAILY PRN
Qty: 45 TABLET | Refills: 5 | Status: SHIPPED | OUTPATIENT
Start: 2022-03-15

## 2022-02-22 RX ORDER — PAROXETINE HYDROCHLORIDE 20 MG/1
20 TABLET, FILM COATED ORAL DAILY
Qty: 90 TABLET | Refills: 3 | Status: SHIPPED | OUTPATIENT
Start: 2022-02-22

## 2022-02-23 LAB
C TRACH DNA SPEC QL NAA+PROBE: NEGATIVE
N GONORRHOEA DNA SPEC QL NAA+PROBE: NEGATIVE

## 2022-03-05 ENCOUNTER — TELEPHONE (OUTPATIENT)
Dept: FAMILY MEDICINE CLINIC | Facility: CLINIC | Age: 27
End: 2022-03-05

## 2022-03-05 PROBLEM — R87.613 HGSIL (HIGH GRADE SQUAMOUS INTRAEPITHELIAL LESION) ON PAP SMEAR OF CERVIX: Status: ACTIVE | Noted: 2022-02-22

## 2022-03-05 NOTE — TELEPHONE ENCOUNTER
On-call: called asking about her pap results. Shows HGSIL and wondering about next steps. Never had a pap previously. Discussed she will be referred to gyne for likely colposcopy to determine further tx. Says she has a family friend gynecologist she will see. Told her if they turn out not to be covered by insurance and still needs a referral to let us know as we will be happy to refer.

## 2022-03-07 ENCOUNTER — TELEMEDICINE (OUTPATIENT)
Dept: FAMILY MEDICINE CLINIC | Facility: CLINIC | Age: 27
End: 2022-03-07

## 2022-03-07 ENCOUNTER — TELEPHONE (OUTPATIENT)
Dept: FAMILY MEDICINE CLINIC | Facility: CLINIC | Age: 27
End: 2022-03-07

## 2022-03-07 DIAGNOSIS — R11.0 NAUSEA: ICD-10-CM

## 2022-03-07 DIAGNOSIS — R10.9 ABDOMINAL DISCOMFORT: Primary | ICD-10-CM

## 2022-03-07 PROCEDURE — 99213 OFFICE O/P EST LOW 20 MIN: CPT | Performed by: FAMILY MEDICINE

## 2022-03-07 RX ORDER — ONDANSETRON 4 MG/1
4 TABLET, FILM COATED ORAL EVERY 8 HOURS PRN
Qty: 20 TABLET | Refills: 0 | Status: SHIPPED | OUTPATIENT
Start: 2022-03-07 | End: 2022-03-14

## 2022-03-07 RX ORDER — FAMOTIDINE 40 MG/1
40 TABLET, FILM COATED ORAL DAILY
Qty: 10 TABLET | Refills: 0 | Status: SHIPPED | OUTPATIENT
Start: 2022-03-07 | End: 2022-03-17

## 2022-03-08 NOTE — TELEPHONE ENCOUNTER
Patient had telehealth visit with provider earlier today, 3/7/2022, patient states she was to have received 2 prescriptions, however no prescriptions were sent to her pharmacy.

## 2022-04-05 ENCOUNTER — TELEPHONE (OUTPATIENT)
Dept: FAMILY MEDICINE CLINIC | Facility: CLINIC | Age: 27
End: 2022-04-05

## 2022-04-05 ENCOUNTER — TELEMEDICINE (OUTPATIENT)
Dept: TELEHEALTH | Age: 27
End: 2022-04-05

## 2022-04-05 VITALS — TEMPERATURE: 100 F

## 2022-04-05 DIAGNOSIS — J11.1 INFLUENZA-LIKE ILLNESS: Primary | ICD-10-CM

## 2022-04-05 PROCEDURE — 99213 OFFICE O/P EST LOW 20 MIN: CPT | Performed by: PHYSICIAN ASSISTANT

## 2022-04-05 RX ORDER — DEXTROMETHORPHAN HYDROBROMIDE AND PROMETHAZINE HYDROCHLORIDE 15; 6.25 MG/5ML; MG/5ML
5 SYRUP ORAL 4 TIMES DAILY PRN
Qty: 120 ML | Refills: 0 | Status: SHIPPED | OUTPATIENT
Start: 2022-04-05 | End: 2022-04-08 | Stop reason: ALTCHOICE

## 2022-04-05 RX ORDER — BENZONATATE 100 MG/1
100 CAPSULE ORAL 3 TIMES DAILY PRN
Qty: 30 CAPSULE | Refills: 0 | Status: SHIPPED | OUTPATIENT
Start: 2022-04-05

## 2022-04-05 NOTE — TELEPHONE ENCOUNTER
Pharmacy calling-pt did an on-demand visit with PA today. Prescribed cough medicine. Pharmacist calling because there is an interaction between the cough medicine and the anti-depressant. Pharmacist tried calling PA but was asked to call this office, as it was a telehealth visit.

## 2022-04-05 NOTE — TELEPHONE ENCOUNTER
Patient was called and informed of cough medication change as stated below. Patient verbalized understanding. All questions answered.

## 2022-04-05 NOTE — TELEPHONE ENCOUNTER
Pharmacist states drug interaction of promethazine and paroxetine, they increase the levels of each other. Pharmacist states she attempted to reach the PA that pt saw this morning but was unsuccessful and was told to call our office.

## 2022-04-05 NOTE — TELEPHONE ENCOUNTER
Yes, the paroxetine interacts with almost all cough meds. Could change to tessalon.   Rx sent    Abiel Faster, DO  Family Medicine

## 2022-04-08 RX ORDER — MELATONIN
1000 DAILY
COMMUNITY

## 2022-04-08 NOTE — PAT NURSING NOTE
Left message for Kelly at Dr. Radha Mosley office. Surgical case request notes IV RN Sedation. Per anesthesia choices for cases in the main OR are General, MAC, or Local. If surgeon wants IV sedation case should be scheduled in the minor procedure room and surgeon would have to give sedation. Case discussed with [facility] clinical leader JOSE. Case also discussed with MAIRA SALVADOR RN.

## 2022-04-11 ENCOUNTER — LAB ENCOUNTER (OUTPATIENT)
Dept: LAB | Age: 27
End: 2022-04-11
Attending: OBSTETRICS & GYNECOLOGY
Payer: COMMERCIAL

## 2022-04-11 DIAGNOSIS — Z20.822 ENCOUNTER FOR PREOPERATIVE SCREENING LABORATORY TESTING FOR COVID-19 VIRUS: ICD-10-CM

## 2022-04-11 DIAGNOSIS — Z01.812 ENCOUNTER FOR PREOPERATIVE SCREENING LABORATORY TESTING FOR COVID-19 VIRUS: ICD-10-CM

## 2022-04-12 LAB — SARS-COV-2 RNA RESP QL NAA+PROBE: NOT DETECTED

## 2022-04-13 ENCOUNTER — ANESTHESIA EVENT (OUTPATIENT)
Dept: SURGERY | Facility: HOSPITAL | Age: 27
End: 2022-04-13
Payer: COMMERCIAL

## 2022-04-13 ENCOUNTER — HOSPITAL ENCOUNTER (OUTPATIENT)
Facility: HOSPITAL | Age: 27
Setting detail: HOSPITAL OUTPATIENT SURGERY
Discharge: HOME | End: 2022-04-13
Attending: OBSTETRICS & GYNECOLOGY | Admitting: OBSTETRICS & GYNECOLOGY
Payer: COMMERCIAL

## 2022-04-13 ENCOUNTER — ANESTHESIA (OUTPATIENT)
Dept: SURGERY | Facility: HOSPITAL | Age: 27
End: 2022-04-13
Payer: COMMERCIAL

## 2022-04-13 VITALS
OXYGEN SATURATION: 98 % | WEIGHT: 148.38 LBS | DIASTOLIC BLOOD PRESSURE: 78 MMHG | BODY MASS INDEX: 25.33 KG/M2 | HEIGHT: 64 IN | HEART RATE: 69 BPM | RESPIRATION RATE: 16 BRPM | SYSTOLIC BLOOD PRESSURE: 122 MMHG | TEMPERATURE: 98 F

## 2022-04-13 DIAGNOSIS — Z20.822 ENCOUNTER FOR PREOPERATIVE SCREENING LABORATORY TESTING FOR COVID-19 VIRUS: Primary | ICD-10-CM

## 2022-04-13 DIAGNOSIS — Z01.812 ENCOUNTER FOR PREOPERATIVE SCREENING LABORATORY TESTING FOR COVID-19 VIRUS: Primary | ICD-10-CM

## 2022-04-13 LAB — B-HCG UR QL: NEGATIVE

## 2022-04-13 PROCEDURE — 81025 URINE PREGNANCY TEST: CPT

## 2022-04-13 PROCEDURE — 88307 TISSUE EXAM BY PATHOLOGIST: CPT | Performed by: OBSTETRICS & GYNECOLOGY

## 2022-04-13 PROCEDURE — 0UBC7ZX EXCISION OF CERVIX, VIA NATURAL OR ARTIFICIAL OPENING, DIAGNOSTIC: ICD-10-PCS | Performed by: OBSTETRICS & GYNECOLOGY

## 2022-04-13 RX ORDER — IBUPROFEN 800 MG/1
800 TABLET ORAL EVERY 8 HOURS PRN
Qty: 30 TABLET | Refills: 3 | Status: SHIPPED | OUTPATIENT
Start: 2022-04-13

## 2022-04-13 RX ORDER — NALOXONE HYDROCHLORIDE 0.4 MG/ML
80 INJECTION, SOLUTION INTRAMUSCULAR; INTRAVENOUS; SUBCUTANEOUS AS NEEDED
Status: DISCONTINUED | OUTPATIENT
Start: 2022-04-13 | End: 2022-04-13

## 2022-04-13 RX ORDER — HYDROCODONE BITARTRATE AND ACETAMINOPHEN 5; 325 MG/1; MG/1
1 TABLET ORAL AS NEEDED
Status: DISCONTINUED | OUTPATIENT
Start: 2022-04-13 | End: 2022-04-13

## 2022-04-13 RX ORDER — MIDAZOLAM HYDROCHLORIDE 1 MG/ML
INJECTION INTRAMUSCULAR; INTRAVENOUS AS NEEDED
Status: DISCONTINUED | OUTPATIENT
Start: 2022-04-13 | End: 2022-04-13 | Stop reason: SURG

## 2022-04-13 RX ORDER — SODIUM CHLORIDE, SODIUM LACTATE, POTASSIUM CHLORIDE, CALCIUM CHLORIDE 600; 310; 30; 20 MG/100ML; MG/100ML; MG/100ML; MG/100ML
INJECTION, SOLUTION INTRAVENOUS CONTINUOUS
Status: DISCONTINUED | OUTPATIENT
Start: 2022-04-13 | End: 2022-04-13

## 2022-04-13 RX ORDER — ONDANSETRON 2 MG/ML
4 INJECTION INTRAMUSCULAR; INTRAVENOUS AS NEEDED
Status: DISCONTINUED | OUTPATIENT
Start: 2022-04-13 | End: 2022-04-13

## 2022-04-13 RX ORDER — HEPARIN SODIUM 5000 [USP'U]/ML
5000 INJECTION, SOLUTION INTRAVENOUS; SUBCUTANEOUS ONCE
Status: DISCONTINUED | OUTPATIENT
Start: 2022-04-13 | End: 2022-04-13

## 2022-04-13 RX ORDER — HYDROCODONE BITARTRATE AND ACETAMINOPHEN 7.5; 325 MG/1; MG/1
1-2 TABLET ORAL EVERY 4 HOURS PRN
Qty: 10 TABLET | Refills: 0 | Status: SHIPPED | OUTPATIENT
Start: 2022-04-13

## 2022-04-13 RX ORDER — HYDROCODONE BITARTRATE AND ACETAMINOPHEN 5; 325 MG/1; MG/1
2 TABLET ORAL AS NEEDED
Status: DISCONTINUED | OUTPATIENT
Start: 2022-04-13 | End: 2022-04-13

## 2022-04-13 RX ORDER — ACETAMINOPHEN 500 MG
1000 TABLET ORAL ONCE AS NEEDED
Status: DISCONTINUED | OUTPATIENT
Start: 2022-04-13 | End: 2022-04-13

## 2022-04-13 RX ORDER — BUPIVACAINE HYDROCHLORIDE 5 MG/ML
INJECTION, SOLUTION EPIDURAL; INTRACAUDAL AS NEEDED
Status: DISCONTINUED | OUTPATIENT
Start: 2022-04-13 | End: 2022-04-13 | Stop reason: HOSPADM

## 2022-04-13 RX ORDER — HYDROMORPHONE HYDROCHLORIDE 1 MG/ML
0.4 INJECTION, SOLUTION INTRAMUSCULAR; INTRAVENOUS; SUBCUTANEOUS EVERY 5 MIN PRN
Status: DISCONTINUED | OUTPATIENT
Start: 2022-04-13 | End: 2022-04-13

## 2022-04-13 RX ORDER — ACETAMINOPHEN 500 MG
1000 TABLET ORAL ONCE
Status: DISCONTINUED | OUTPATIENT
Start: 2022-04-13 | End: 2022-04-13 | Stop reason: HOSPADM

## 2022-04-13 RX ADMIN — SODIUM CHLORIDE, SODIUM LACTATE, POTASSIUM CHLORIDE, CALCIUM CHLORIDE: 600; 310; 30; 20 INJECTION, SOLUTION INTRAVENOUS at 12:20:00

## 2022-04-13 RX ADMIN — MIDAZOLAM HYDROCHLORIDE 2 MG: 1 INJECTION INTRAMUSCULAR; INTRAVENOUS at 11:51:00

## 2022-04-13 NOTE — H&P
Chief complaint: cervical dysplasia    HPI: Pt is 26 yo female [address] who presents for LEEP secondary to KRYSTIN 3 cervical dysplasia    Pmhx: anxiety, asthma  Psxh: none  Pobhx: sab x 1    Review of systems: non contributory    PE: afeb vss  Chest: ctab  Cv: rrr  Abd: soft non tender    A/p: Cervical dysplasia, plan for LEEP

## 2022-04-13 NOTE — BRIEF OP NOTE
Pre-Operative Diagnosis: CERVICAL DYSPLASIA     Post-Operative Diagnosis: CERVICAL DYSPLASIA      Procedure Performed:   LOOP ELECTROSURGICAL EXCISION PROCEDURE    Surgeon(s) and Role:     Mike Mccarthy MD - Primary    Assistant(s):   none     Surgical Findings: cervical dysplasia     Specimen: cervix     Estimated Blood Loss: Blood Output: 25 mL (4/13/2022 12:08 PM)      Dictation Number:  [id number]    Warren Cowart MD  4/13/2022  12:13 PM

## 2022-04-13 NOTE — OR PREOP
No pre-op orders were in chart. This RN placed orders and called Pre-admission testing to ask whether ABT was needed for this procedure and was told by Kiersten Moore RN that it was not normally ordered for this procedure.

## 2022-04-14 NOTE — OPERATIVE REPORT
[facility]    PATIENT'S NAME: Nereyda Caldwell   ATTENDING PHYSICIAN: Lisa Mcwilliams M.D. OPERATING PHYSICIAN: Lisa Mcwilliams M.D. PATIENT ACCOUNT#:   [de-identified]    LOCATION:  [facility] 10  MEDICAL RECORD #:   [id number]       YOB: 1995  ADMISSION DATE:       04/13/2022      OPERATION DATE:  04/13/2022    OPERATIVE REPORT    PREOPERATIVE DIAGNOSIS:  Cervical dysplasia. POSTOPERATIVE DIAGNOSIS:  Cervical dysplasia. PROCEDURE:  Loop electrocautery excisional procedure. ANESTHESIA:  IV sedation. ESTIMATED BLOOD LOSS:  25 mL. COMPLICATIONS:  None. FINDINGS:  Cervical dysplasia. OPERATIVE TECHNIQUE:  After informed consent was obtained, patient was taken to the operating room where she received IV sedation. Patient was then prepped and draped in usual fashion for a vaginal procedure. Insulated speculum was placed. Speculum was connected to suction. Loop electrocautery was used to remove a thin portion of the cervix. Specimen was sent to pathology. The cervical area was then cauterized and Monsel's was placed. All instrument and sponge counts were correct x2. Patient tolerated the procedure well and returned to recovery room in stable and satisfactory condition.     Dictated By Lisa Mcwilliams M.D.  d: 04/13/2022 13:01:42  t: 04/14/2022 00:37:16  Job [id number]  FN/

## 2022-05-05 ENCOUNTER — E-VISIT (OUTPATIENT)
Dept: TELEHEALTH | Age: 27
End: 2022-05-05

## 2022-05-05 DIAGNOSIS — R09.81 NASAL CONGESTION: Primary | ICD-10-CM

## 2022-05-05 PROCEDURE — 99422 OL DIG E/M SVC 11-20 MIN: CPT | Performed by: PHYSICIAN ASSISTANT

## 2022-05-11 ENCOUNTER — TELEMEDICINE (OUTPATIENT)
Dept: TELEHEALTH | Age: 27
End: 2022-05-11

## 2022-05-11 DIAGNOSIS — J01.90 ACUTE NON-RECURRENT SINUSITIS, UNSPECIFIED LOCATION: Primary | ICD-10-CM

## 2022-05-11 PROCEDURE — 99213 OFFICE O/P EST LOW 20 MIN: CPT | Performed by: PHYSICIAN ASSISTANT

## 2022-05-11 RX ORDER — DOXYCYCLINE HYCLATE 100 MG/1
100 CAPSULE ORAL 2 TIMES DAILY
Qty: 10 CAPSULE | Refills: 0 | Status: SHIPPED | OUTPATIENT
Start: 2022-05-11 | End: 2022-05-16

## 2022-09-27 ENCOUNTER — TELEPHONE (OUTPATIENT)
Dept: FAMILY MEDICINE CLINIC | Facility: CLINIC | Age: 27
End: 2022-09-27

## 2022-09-27 DIAGNOSIS — F41.1 GAD (GENERALIZED ANXIETY DISORDER): ICD-10-CM

## 2022-09-27 RX ORDER — CLONAZEPAM 0.5 MG/1
0.5 TABLET ORAL 2 TIMES DAILY PRN
Qty: 45 TABLET | Refills: 5 | Status: SHIPPED | OUTPATIENT
Start: 2022-09-27

## 2022-09-27 NOTE — TELEPHONE ENCOUNTER
Requesting Clonazepam 0.5mg  LOV: 3/7/22 VV  RTC: prn  Last Relevant Labs:   Filled:  3/15/22 #45 with 5 refills    No future appointments.     Rx pended and routed for approval/denial

## 2022-09-27 NOTE — TELEPHONE ENCOUNTER
Patient states she is out, she had asked the pharmacy a week ago to request a refill, please advise.

## 2022-09-27 NOTE — TELEPHONE ENCOUNTER
Future Appointments   Date Time Provider Nataly Emerson   9/28/2022  4:30 PM Karen Uriarte,  [facility] [address] [facility] [address]     Patient scheduled appointment for VV via MyChart for BV. Please advise if needs in-person visit.

## 2022-09-30 ENCOUNTER — TELEMEDICINE (OUTPATIENT)
Dept: FAMILY MEDICINE CLINIC | Facility: CLINIC | Age: 27
End: 2022-09-30

## 2022-09-30 DIAGNOSIS — N89.8 VAGINAL ODOR: Primary | ICD-10-CM

## 2022-09-30 PROCEDURE — 99213 OFFICE O/P EST LOW 20 MIN: CPT | Performed by: FAMILY MEDICINE

## 2022-09-30 RX ORDER — METRONIDAZOLE 500 MG/1
500 TABLET ORAL 2 TIMES DAILY
Qty: 14 TABLET | Refills: 0 | Status: SHIPPED | OUTPATIENT
Start: 2022-09-30 | End: 2022-10-07

## 2022-10-24 ENCOUNTER — TELEPHONE (OUTPATIENT)
Dept: FAMILY MEDICINE CLINIC | Facility: CLINIC | Age: 27
End: 2022-10-24

## 2022-10-24 NOTE — TELEPHONE ENCOUNTER
Pt states the \"racing heart\" feeling has been going on for about 2 weeks. Pt states \"it just comes on out of no where, I can even be laying down. My heart is racing and I can feel it through my clothes, it lasts like 2 minutes then it's over but it happens a lot. \" Pt states she has generalized anxiety but states this is not an anxiety feeling. Pt denies any other symptoms aside from racing heart rate. Offered to schedule pt a sooner appointment, pt accepted and appointment rescheduled.    Future Appointments   Date Time Provider Nataly Emerson   10/27/2022 10:30 AM Michelle Diaz DO [facility] [address] [facility] [address]

## 2022-10-27 ENCOUNTER — OFFICE VISIT (OUTPATIENT)
Dept: FAMILY MEDICINE CLINIC | Facility: CLINIC | Age: 27
End: 2022-10-27
Payer: COMMERCIAL

## 2022-10-27 VITALS
BODY MASS INDEX: 26.46 KG/M2 | OXYGEN SATURATION: 99 % | HEART RATE: 81 BPM | DIASTOLIC BLOOD PRESSURE: 70 MMHG | HEIGHT: 64 IN | WEIGHT: 155 LBS | RESPIRATION RATE: 16 BRPM | TEMPERATURE: 97 F | SYSTOLIC BLOOD PRESSURE: 122 MMHG

## 2022-10-27 DIAGNOSIS — F41.1 GAD (GENERALIZED ANXIETY DISORDER): ICD-10-CM

## 2022-10-27 DIAGNOSIS — R00.0 TACHYCARDIA: Primary | ICD-10-CM

## 2022-10-27 LAB
ATRIAL RATE: 70 BPM
P AXIS: 40 DEGREES
P-R INTERVAL: 144 MS
Q-T INTERVAL: 372 MS
QRS DURATION: 70 MS
QTC CALCULATION (BEZET): 401 MS
R AXIS: 39 DEGREES
T AXIS: 21 DEGREES
VENTRICULAR RATE: 70 BPM

## 2022-10-27 PROCEDURE — 3008F BODY MASS INDEX DOCD: CPT | Performed by: FAMILY MEDICINE

## 2022-10-27 PROCEDURE — 3074F SYST BP LT 130 MM HG: CPT | Performed by: FAMILY MEDICINE

## 2022-10-27 PROCEDURE — 99214 OFFICE O/P EST MOD 30 MIN: CPT | Performed by: FAMILY MEDICINE

## 2022-10-27 PROCEDURE — 93000 ELECTROCARDIOGRAM COMPLETE: CPT | Performed by: FAMILY MEDICINE

## 2022-10-27 PROCEDURE — 3078F DIAST BP <80 MM HG: CPT | Performed by: FAMILY MEDICINE

## 2022-10-27 RX ORDER — PAROXETINE 10 MG/1
10 TABLET, FILM COATED ORAL EVERY MORNING
Qty: 7 TABLET | Refills: 0 | Status: SHIPPED | OUTPATIENT
Start: 2022-10-27 | End: 2022-11-03

## 2022-10-27 RX ORDER — CLONAZEPAM 0.5 MG/1
0.5 TABLET ORAL 2 TIMES DAILY PRN
Qty: 45 TABLET | Refills: 1 | Status: SHIPPED | OUTPATIENT
Start: 2022-10-27

## 2022-10-28 ENCOUNTER — LAB ENCOUNTER (OUTPATIENT)
Dept: LAB | Age: 27
End: 2022-10-28
Attending: FAMILY MEDICINE
Payer: COMMERCIAL

## 2022-10-28 DIAGNOSIS — R00.0 TACHYCARDIA: ICD-10-CM

## 2022-10-28 LAB
BASOPHILS # BLD AUTO: 0.02 X10(3) UL (ref 0–0.2)
BASOPHILS NFR BLD AUTO: 0.4 %
D DIMER PPP FEU-MCNC: 0.36 UG/ML FEU (ref ?–0.5)
EOSINOPHIL # BLD AUTO: 0.07 X10(3) UL (ref 0–0.7)
EOSINOPHIL NFR BLD AUTO: 1.3 %
ERYTHROCYTE [DISTWIDTH] IN BLOOD BY AUTOMATED COUNT: 12.4 %
HCT VFR BLD AUTO: 41.5 %
HGB BLD-MCNC: 14.7 G/DL
IMM GRANULOCYTES # BLD AUTO: 0.02 X10(3) UL (ref 0–1)
IMM GRANULOCYTES NFR BLD: 0.4 %
LYMPHOCYTES # BLD AUTO: 1.37 X10(3) UL (ref 1–4)
LYMPHOCYTES NFR BLD AUTO: 24.5 %
MCH RBC QN AUTO: 30.5 PG (ref 26–34)
MCHC RBC AUTO-ENTMCNC: 35.4 G/DL (ref 31–37)
MCV RBC AUTO: 86.1 FL
MONOCYTES # BLD AUTO: 0.41 X10(3) UL (ref 0.1–1)
MONOCYTES NFR BLD AUTO: 7.3 %
NEUTROPHILS # BLD AUTO: 3.7 X10 (3) UL (ref 1.5–7.7)
NEUTROPHILS # BLD AUTO: 3.7 X10(3) UL (ref 1.5–7.7)
NEUTROPHILS NFR BLD AUTO: 66.1 %
PLATELET # BLD AUTO: 290 10(3)UL (ref 150–450)
RBC # BLD AUTO: 4.82 X10(6)UL
T4 FREE SERPL-MCNC: 0.9 NG/DL (ref 0.8–1.7)
TROPONIN I HIGH SENSITIVITY: 4 NG/L
TSI SER-ACNC: 1.58 MIU/ML (ref 0.36–3.74)
WBC # BLD AUTO: 5.6 X10(3) UL (ref 4–11)

## 2022-10-28 PROCEDURE — 84484 ASSAY OF TROPONIN QUANT: CPT

## 2022-10-28 PROCEDURE — 85025 COMPLETE CBC W/AUTO DIFF WBC: CPT

## 2022-10-28 PROCEDURE — 85379 FIBRIN DEGRADATION QUANT: CPT

## 2022-10-28 PROCEDURE — 84439 ASSAY OF FREE THYROXINE: CPT

## 2022-10-28 PROCEDURE — 84443 ASSAY THYROID STIM HORMONE: CPT

## 2022-10-28 PROCEDURE — 36415 COLL VENOUS BLD VENIPUNCTURE: CPT

## 2022-12-06 ENCOUNTER — E-VISIT (OUTPATIENT)
Dept: TELEHEALTH | Age: 27
End: 2022-12-06

## 2022-12-06 DIAGNOSIS — R39.9 UTI SYMPTOMS: Primary | ICD-10-CM

## 2022-12-06 RX ORDER — NITROFURANTOIN 25; 75 MG/1; MG/1
100 CAPSULE ORAL 2 TIMES DAILY
Qty: 14 CAPSULE | Refills: 0 | Status: SHIPPED | OUTPATIENT
Start: 2022-12-06 | End: 2022-12-13

## 2023-01-10 DIAGNOSIS — F41.1 GAD (GENERALIZED ANXIETY DISORDER): ICD-10-CM

## 2023-01-11 ENCOUNTER — TELEPHONE (OUTPATIENT)
Dept: FAMILY MEDICINE CLINIC | Facility: CLINIC | Age: 28
End: 2023-01-11

## 2023-01-11 NOTE — TELEPHONE ENCOUNTER
Future Appointments   Date Time Provider Nataly Emerson   2/3/2023 10:00 AM Tripp Trevino, DO [facility] [address] [facility] [address]     Patient requesting clonazepam & sertraline, please advise.

## 2023-01-11 NOTE — TELEPHONE ENCOUNTER
Requesting Clonazepam 0.5mg  LOV: 10/27/22  RTC: 2 weeks  Last Relevant Labs: 10/28/22  Filled: 10/27/22 #45 with 1 refills    Requesting Sertraline 50mg  LOV: 10/27/22  RTC: 2 weeks  Last Relevant Labs: 10/28/22  Filled: 10/27/22 #90 with 0 refills    Future Appointments   Date Time Provider Nataly Emerson   2/3/2023 10:00 AM Marcos Trevino, DO [facility] [address] [facility] [address]     RXs pended and routed for approval/denial

## 2023-01-12 RX ORDER — CLONAZEPAM 0.5 MG/1
0.5 TABLET ORAL 2 TIMES DAILY PRN
Qty: 45 TABLET | Refills: 0 | Status: SHIPPED | OUTPATIENT
Start: 2023-01-12

## 2023-01-19 ENCOUNTER — E-VISIT (OUTPATIENT)
Dept: TELEHEALTH | Age: 28
End: 2023-01-19

## 2023-01-19 DIAGNOSIS — R05.1 ACUTE COUGH: Primary | ICD-10-CM

## 2023-01-19 DIAGNOSIS — J01.90 ACUTE NON-RECURRENT SINUSITIS, UNSPECIFIED LOCATION: ICD-10-CM

## 2023-01-19 PROCEDURE — 99421 OL DIG E/M SVC 5-10 MIN: CPT | Performed by: FAMILY MEDICINE

## 2023-01-19 RX ORDER — ALBUTEROL SULFATE 90 UG/1
2 AEROSOL, METERED RESPIRATORY (INHALATION) EVERY 4 HOURS PRN
Qty: 18 G | Refills: 0 | Status: SHIPPED | OUTPATIENT
Start: 2023-01-19

## 2023-01-19 RX ORDER — METHYLPREDNISOLONE 4 MG/1
TABLET ORAL
Qty: 21 EACH | Refills: 0 | Status: SHIPPED | OUTPATIENT
Start: 2023-01-19

## 2023-01-19 RX ORDER — AZITHROMYCIN 250 MG/1
TABLET, FILM COATED ORAL
Qty: 6 TABLET | Refills: 0 | Status: SHIPPED | OUTPATIENT
Start: 2023-01-19 | End: 2023-01-24

## 2023-01-27 ENCOUNTER — TELEPHONE (OUTPATIENT)
Dept: FAMILY MEDICINE CLINIC | Facility: CLINIC | Age: 28
End: 2023-01-27

## 2023-01-27 DIAGNOSIS — Z00.00 LABORATORY EXAMINATION ORDERED AS PART OF A ROUTINE GENERAL MEDICAL EXAMINATION: Primary | ICD-10-CM

## 2023-02-03 ENCOUNTER — OFFICE VISIT (OUTPATIENT)
Dept: FAMILY MEDICINE CLINIC | Facility: CLINIC | Age: 28
End: 2023-02-03
Payer: COMMERCIAL

## 2023-02-03 VITALS
DIASTOLIC BLOOD PRESSURE: 68 MMHG | TEMPERATURE: 97 F | SYSTOLIC BLOOD PRESSURE: 110 MMHG | BODY MASS INDEX: 27.38 KG/M2 | HEART RATE: 72 BPM | WEIGHT: 160.38 LBS | RESPIRATION RATE: 16 BRPM | HEIGHT: 64 IN

## 2023-02-03 DIAGNOSIS — J45.990 EXERCISE-INDUCED ASTHMA: ICD-10-CM

## 2023-02-03 DIAGNOSIS — F41.1 GAD (GENERALIZED ANXIETY DISORDER): ICD-10-CM

## 2023-02-03 DIAGNOSIS — D06.9 HIGH GRADE SQUAMOUS INTRAEPITHELIAL LESION (HGSIL), GRADE 3 CIN, ON BIOPSY OF CERVIX: ICD-10-CM

## 2023-02-03 DIAGNOSIS — Z00.00 WELL ADULT EXAM: Primary | ICD-10-CM

## 2023-02-03 PROBLEM — M54.12 CERVICAL RADICULOPATHY: Status: RESOLVED | Noted: 2021-09-16 | Resolved: 2023-02-03

## 2023-02-03 PROBLEM — R30.0 DYSURIA: Status: RESOLVED | Noted: 2020-03-08 | Resolved: 2023-02-03

## 2023-02-03 PROBLEM — R35.0 URINARY FREQUENCY: Status: RESOLVED | Noted: 2021-09-16 | Resolved: 2023-02-03

## 2023-02-03 PROBLEM — M25.512 ACUTE PAIN OF LEFT SHOULDER: Status: RESOLVED | Noted: 2021-09-16 | Resolved: 2023-02-03

## 2023-02-03 PROCEDURE — 3008F BODY MASS INDEX DOCD: CPT | Performed by: FAMILY MEDICINE

## 2023-02-03 PROCEDURE — 99395 PREV VISIT EST AGE 18-39: CPT | Performed by: FAMILY MEDICINE

## 2023-02-03 PROCEDURE — 3074F SYST BP LT 130 MM HG: CPT | Performed by: FAMILY MEDICINE

## 2023-02-03 PROCEDURE — 3078F DIAST BP <80 MM HG: CPT | Performed by: FAMILY MEDICINE

## 2023-02-03 RX ORDER — CHOLECALCIFEROL (VITAMIN D3) 125 MCG
500 CAPSULE ORAL DAILY
COMMUNITY

## 2023-02-03 RX ORDER — CLONAZEPAM 0.5 MG/1
0.5 TABLET ORAL 2 TIMES DAILY PRN
Qty: 45 TABLET | Refills: 2 | Status: SHIPPED | OUTPATIENT
Start: 2023-02-03

## 2023-04-11 NOTE — PROGRESS NOTES
[facility] Internal Medicine Progress Note    CC:  Patient presents with:  Medication Request      HPI:   HPI  Anxiety  Pt states this is the best her anxiety has been in 2 years  She states she is taking Clonazepam only at bedtime  She states sh No respiratory distress. She has no wheezes. She has no rales. Abdominal: Soft. Bowel sounds are normal. There is no tenderness. Lymphadenopathy:     She has no cervical adenopathy. Neurological: She is alert and oriented to person, place, and time. Referred To Asc For Closure Text (Leave Blank If You Do Not Want): After obtaining clear surgical margins the patient was sent to an ASC for surgical repair.  The patient understands they will receive post-surgical care and follow-up from the ASC physician.

## 2023-05-02 ENCOUNTER — TELEPHONE (OUTPATIENT)
Dept: FAMILY MEDICINE CLINIC | Facility: CLINIC | Age: 28
End: 2023-05-02

## 2023-05-02 ENCOUNTER — OFFICE VISIT (OUTPATIENT)
Dept: FAMILY MEDICINE CLINIC | Facility: CLINIC | Age: 28
End: 2023-05-02
Payer: COMMERCIAL

## 2023-05-02 VITALS
SYSTOLIC BLOOD PRESSURE: 110 MMHG | RESPIRATION RATE: 16 BRPM | OXYGEN SATURATION: 100 % | WEIGHT: 158.13 LBS | HEART RATE: 60 BPM | TEMPERATURE: 97 F | BODY MASS INDEX: 27 KG/M2 | DIASTOLIC BLOOD PRESSURE: 70 MMHG | HEIGHT: 64 IN

## 2023-05-02 DIAGNOSIS — Z13.6 ISCHEMIC HEART DISEASE SCREEN: ICD-10-CM

## 2023-05-02 DIAGNOSIS — E66.3 OVERWEIGHT (BMI 25.0-29.9): Primary | ICD-10-CM

## 2023-05-02 LAB
ATRIAL RATE: 69 BPM
P AXIS: 31 DEGREES
P-R INTERVAL: 142 MS
Q-T INTERVAL: 378 MS
QRS DURATION: 66 MS
QTC CALCULATION (BEZET): 405 MS
R AXIS: 34 DEGREES
T AXIS: 21 DEGREES
VENTRICULAR RATE: 69 BPM

## 2023-05-02 PROCEDURE — 93000 ELECTROCARDIOGRAM COMPLETE: CPT | Performed by: FAMILY MEDICINE

## 2023-05-02 PROCEDURE — 3074F SYST BP LT 130 MM HG: CPT | Performed by: FAMILY MEDICINE

## 2023-05-02 PROCEDURE — 3078F DIAST BP <80 MM HG: CPT | Performed by: FAMILY MEDICINE

## 2023-05-02 PROCEDURE — 99213 OFFICE O/P EST LOW 20 MIN: CPT | Performed by: FAMILY MEDICINE

## 2023-05-02 RX ORDER — PHENTERMINE HYDROCHLORIDE 15 MG/1
15 CAPSULE ORAL EVERY MORNING
Qty: 30 CAPSULE | Refills: 0 | Status: SHIPPED | OUTPATIENT
Start: 2023-05-02

## 2023-05-02 NOTE — TELEPHONE ENCOUNTER
Pt states the [facility] [facility] does not have this med Phentermine HCl 15 MG Oral Cap in stock. They do have the generic available. Musa needs Dr. Caro Morgan approval to fill with the generic. Please advise pharm.   [id number] - [address] [facility] [address] [facility] [address] [place] [address], [phone]

## 2023-05-02 NOTE — TELEPHONE ENCOUNTER
Spoke with pharmacy to clarify below message, states they just got off the phone with the patient and advised pt that they do not have any Phentermine in stock but will have it tomorrow and will fill Rx tomorrow. They have everything they need from our office.

## 2023-05-14 ENCOUNTER — E-VISIT (OUTPATIENT)
Dept: TELEHEALTH | Age: 28
End: 2023-05-14

## 2023-05-14 DIAGNOSIS — N30.00 ACUTE CYSTITIS WITHOUT HEMATURIA: Primary | ICD-10-CM

## 2023-05-14 RX ORDER — NITROFURANTOIN 25; 75 MG/1; MG/1
100 CAPSULE ORAL 2 TIMES DAILY
Qty: 10 CAPSULE | Refills: 0 | Status: SHIPPED | OUTPATIENT
Start: 2023-05-14 | End: 2023-05-19

## 2023-06-02 ENCOUNTER — E-VISIT (OUTPATIENT)
Dept: TELEHEALTH | Age: 28
End: 2023-06-02

## 2023-06-02 DIAGNOSIS — R19.7 DIARRHEA, UNSPECIFIED TYPE: Primary | ICD-10-CM

## 2023-06-02 PROCEDURE — 99421 OL DIG E/M SVC 5-10 MIN: CPT | Performed by: PHYSICIAN ASSISTANT

## 2023-06-08 ENCOUNTER — OFFICE VISIT (OUTPATIENT)
Dept: FAMILY MEDICINE CLINIC | Facility: CLINIC | Age: 28
End: 2023-06-08
Payer: COMMERCIAL

## 2023-06-08 VITALS
HEIGHT: 64 IN | TEMPERATURE: 98 F | RESPIRATION RATE: 16 BRPM | SYSTOLIC BLOOD PRESSURE: 112 MMHG | HEART RATE: 87 BPM | WEIGHT: 149 LBS | DIASTOLIC BLOOD PRESSURE: 70 MMHG | OXYGEN SATURATION: 98 % | BODY MASS INDEX: 25.44 KG/M2

## 2023-06-08 DIAGNOSIS — N30.00 ACUTE CYSTITIS WITHOUT HEMATURIA: Primary | ICD-10-CM

## 2023-06-08 DIAGNOSIS — E66.3 OVERWEIGHT (BMI 25.0-29.9): ICD-10-CM

## 2023-06-08 LAB
APPEARANCE: CLEAR
GLUCOSE (URINE DIPSTICK): NEGATIVE MG/DL
KETONES (URINE DIPSTICK): 15 MG/DL
NITRITE, URINE: NEGATIVE
PH, URINE: 5 (ref 4.5–8)
PROTEIN (URINE DIPSTICK): 30 MG/DL
SPECIFIC GRAVITY: 1.03 (ref 1–1.03)
UROBILINOGEN,SEMI-QN: 0.2 MG/DL (ref 0–1.9)

## 2023-06-08 PROCEDURE — 81003 URINALYSIS AUTO W/O SCOPE: CPT | Performed by: FAMILY MEDICINE

## 2023-06-08 PROCEDURE — 87147 CULTURE TYPE IMMUNOLOGIC: CPT | Performed by: FAMILY MEDICINE

## 2023-06-08 PROCEDURE — 3078F DIAST BP <80 MM HG: CPT | Performed by: FAMILY MEDICINE

## 2023-06-08 PROCEDURE — 99214 OFFICE O/P EST MOD 30 MIN: CPT | Performed by: FAMILY MEDICINE

## 2023-06-08 PROCEDURE — 3008F BODY MASS INDEX DOCD: CPT | Performed by: FAMILY MEDICINE

## 2023-06-08 PROCEDURE — 87086 URINE CULTURE/COLONY COUNT: CPT | Performed by: FAMILY MEDICINE

## 2023-06-08 PROCEDURE — 3074F SYST BP LT 130 MM HG: CPT | Performed by: FAMILY MEDICINE

## 2023-06-08 RX ORDER — SULFAMETHOXAZOLE AND TRIMETHOPRIM 800; 160 MG/1; MG/1
1 TABLET ORAL 2 TIMES DAILY
Qty: 10 TABLET | Refills: 0 | Status: SHIPPED | OUTPATIENT
Start: 2023-06-08 | End: 2023-06-11

## 2023-06-08 RX ORDER — PHENTERMINE HYDROCHLORIDE 15 MG/1
15 CAPSULE ORAL EVERY MORNING
Qty: 30 CAPSULE | Refills: 0 | Status: SHIPPED | OUTPATIENT
Start: 2023-06-08

## 2023-06-08 RX ORDER — SPIRONOLACTONE 50 MG/1
1 TABLET, FILM COATED ORAL DAILY
COMMUNITY
Start: 2023-05-30

## 2023-06-11 RX ORDER — CEPHALEXIN 500 MG/1
500 CAPSULE ORAL 3 TIMES DAILY
Qty: 21 CAPSULE | Refills: 0 | Status: SHIPPED | OUTPATIENT
Start: 2023-06-11 | End: 2023-06-18

## 2023-06-14 ENCOUNTER — PATIENT MESSAGE (OUTPATIENT)
Dept: FAMILY MEDICINE CLINIC | Facility: CLINIC | Age: 28
End: 2023-06-14

## 2023-06-14 DIAGNOSIS — E66.3 OVERWEIGHT (BMI 25.0-29.9): ICD-10-CM

## 2023-06-15 RX ORDER — PHENTERMINE HYDROCHLORIDE 15 MG/1
15 CAPSULE ORAL EVERY MORNING
Qty: 30 CAPSULE | Refills: 0 | OUTPATIENT
Start: 2023-06-15

## 2023-06-15 RX ORDER — PHENTERMINE HYDROCHLORIDE 15 MG/1
15 CAPSULE ORAL EVERY MORNING
Qty: 30 CAPSULE | Refills: 0 | Status: SHIPPED | OUTPATIENT
Start: 2023-06-15

## 2023-06-15 NOTE — TELEPHONE ENCOUNTER
From: Maria Isabel Hubbard  To: Andrea Chopra DO  Sent: 6/14/2023 6:58 PM CDT  Subject: Phentermine     Hello,   [facility] can't refill my script because phentermine is back ordered there but I called [facility] and they have it in stock but they need a new script for it to be filled since it's controlled.   Thanks,  Albin Sanders

## 2023-06-21 DIAGNOSIS — F41.1 GAD (GENERALIZED ANXIETY DISORDER): ICD-10-CM

## 2023-06-22 RX ORDER — CLONAZEPAM 0.5 MG/1
0.5 TABLET ORAL 2 TIMES DAILY PRN
Qty: 45 TABLET | Refills: 0 | Status: SHIPPED | OUTPATIENT
Start: 2023-06-22

## 2023-06-22 NOTE — TELEPHONE ENCOUNTER
Requesting Clonazepam 0.5mg  LOV: 6/8/23  RTC: not noted  Last Relevant Labs: 10/28/22  Filled: 2/3/23 #45 with 2 refills    Future Appointments   Date Time Provider Nataly Emerson   7/7/2023 12:00 PM Iesha Sullivan DO [facility] [address] [facility] [address]     Per [place] , last dispensed 5/15/23 #45    Rx pended and routed for approval/denial

## 2023-07-18 ENCOUNTER — E-VISIT (OUTPATIENT)
Dept: TELEHEALTH | Age: 28
End: 2023-07-18

## 2023-07-18 DIAGNOSIS — R39.9 UTI SYMPTOMS: Primary | ICD-10-CM

## 2023-07-18 PROCEDURE — 99422 OL DIG E/M SVC 11-20 MIN: CPT | Performed by: NURSE PRACTITIONER

## 2023-07-18 RX ORDER — NITROFURANTOIN 25; 75 MG/1; MG/1
100 CAPSULE ORAL 2 TIMES DAILY
Qty: 10 CAPSULE | Refills: 0 | Status: SHIPPED | OUTPATIENT
Start: 2023-07-18 | End: 2023-07-18 | Stop reason: CLARIF

## 2023-07-18 RX ORDER — CEPHALEXIN 500 MG/1
500 CAPSULE ORAL 2 TIMES DAILY
Qty: 14 CAPSULE | Refills: 0 | Status: SHIPPED | OUTPATIENT
Start: 2023-07-18 | End: 2023-07-25

## 2023-07-18 NOTE — PROGRESS NOTES
Maria Isabel Hubbard is a 32year old female. HPI:   See answers to questions above. Current Outpatient Medications   Medication Sig Dispense Refill    nitrofurantoin monohydrate macro (MACROBID) 100 MG Oral Cap Take 1 capsule (100 mg total) by mouth 2 (two) times daily for 5 days. 10 capsule 0    CLONAZEPAM 0.5 MG Oral Tab Take 1 tablet (0.5 mg total) by mouth 2 (two) times daily as needed for Anxiety. 45 tablet 0    Phentermine HCl 15 MG Oral Cap Take 1 capsule (15 mg total) by mouth every morning. 30 capsule 0    spironolactone 50 MG Oral Tab Take 1 tablet (50 mg total) by mouth daily. tretinoin 0.025 % External Cream Authorized by: ELIDA AGGARWAL      Prenatal Multivit-Min-Fe-FA (PRE- FORMULA OR) Take by mouth. Docosahexaenoic Acid (DHA COMPLETE OR) Take by mouth.      cyanocobalamin 500 MCG Oral Tab Take 1 tablet (500 mcg total) by mouth daily. sertraline 50 MG Oral Tab Take 1 tablet (50 mg total) by mouth every morning. 90 tablet 2    albuterol 108 (90 Base) MCG/ACT Inhalation Aero Soln Inhale 2 puffs into the lungs every 4 (four) hours as needed for Wheezing or Shortness of Breath (cough). 18 g 0    vitamin A 3 MG (80922 UT) Oral Cap Take 1 capsule (10,000 Units total) by mouth daily. Lactobacillus-Inulin (CULTURELLE ADULT ULT BALANCE OR) Take 1 tablet by mouth daily. Folic Acid (FOLATE OR) Take 5,000 mcg by mouth daily. Biotin 71223 MCG Oral Tab Take 1 tablet by mouth daily. Vitamin D3, Cholecalciferol, 125 MCG (5000 UT) Oral Cap Take 1 capsule (5,000 Units total) by mouth daily.         Past Medical History:   Diagnosis Date    Anxiety     Asthma     KRYSTIN III (cervical intraepithelial neoplasia grade III) with severe dysplasia     Exercise-induced asthma 2023    H/O LEEP     HGSIL (high grade squamous intraepithelial lesion) on Pap smear of cervix 2022    will see family friend who is a gyne    High grade squamous intraepithelial lesion (HGSIL), grade 3 KRYSTIN, on biopsy of cervix 2/3/2023      Past Surgical History:   Procedure Laterality Date    HERNIA SURGERY  1996      Family History   Problem Relation Age of Onset    Anxiety Mother       Social History:  Social History     Socioeconomic History    Marital status:    Occupational History    Occupation: EMS   Tobacco Use    Smoking status: Never    Smokeless tobacco: Never   Vaping Use    Vaping Use: Never used   Substance and Sexual Activity    Alcohol use: No    Drug use: No    Sexual activity: Yes     Partners: Male         ASSESSMENT AND PLAN:     Uti symptoms  (primary encounter diagnosis)    Urine testing ordered through lab  Antibiotic as below may be started after urine sample is completed. Advised to seek in person evaluation for any new or worsening symptoms. Meds & Refills for this Visit:  Requested Prescriptions     Signed Prescriptions Disp Refills    cephalexin 500 MG Oral Cap 14 capsule 0     Sig: Take 1 capsule (500 mg total) by mouth 2 (two) times daily for 7 days.        Duration of  the service:  17 minutes    Patient advised to follow up with PCP if no improvement or worsening of symptoms  Refer to MyChart message for specific patient instructions

## 2023-08-05 DIAGNOSIS — F41.1 GAD (GENERALIZED ANXIETY DISORDER): ICD-10-CM

## 2023-08-07 RX ORDER — CLONAZEPAM 0.5 MG/1
0.5 TABLET ORAL 2 TIMES DAILY PRN
Qty: 45 TABLET | Refills: 0 | Status: SHIPPED | OUTPATIENT
Start: 2023-08-07

## 2023-08-07 NOTE — TELEPHONE ENCOUNTER
Requesting Clonazepam 0.5mg  LOV: 6/8/23  RTC: not noted  Last Relevant Labs: 10/28/22  Filled: 6/22/23 #45 with 0 refills    No future appointments.     Per [place] , last dispensed 6/22/23 #45    Rx pended and routed for approval/denial

## 2023-08-08 DIAGNOSIS — F41.1 GAD (GENERALIZED ANXIETY DISORDER): ICD-10-CM

## 2023-08-08 RX ORDER — CLONAZEPAM 0.5 MG/1
0.5 TABLET ORAL 2 TIMES DAILY PRN
Qty: 45 TABLET | Refills: 0 | OUTPATIENT
Start: 2023-08-08

## 2023-08-08 NOTE — TELEPHONE ENCOUNTER
Request denied - Rx was sent 8/7/23     Disp Refills Start End    clonazePAM 0.5 MG Oral Tab 45 tablet 0 8/7/2023     Sig - Route:  Take 1 tablet (0.5 mg total) by mouth 2 (two) times daily as needed for Anxiety. - Oral    Sent to pharmacy as: clonazePAM 0.5 MG Oral Tablet (KlonoPIN)    E-Prescribing Status: Receipt confirmed by pharmacy (8/7/2023 10:47 AM CDT)    Renewals    Renewal provider: Ramírez Reyes, [address] [place] [address], [phone]

## 2023-09-11 DIAGNOSIS — F41.1 GAD (GENERALIZED ANXIETY DISORDER): ICD-10-CM

## 2023-09-12 NOTE — TELEPHONE ENCOUNTER
Patient is requesting a refill on: Clonazepam 0.5 mg # 45  Last LOV: Acute visit 6/8/23  Last Refill: 8/9/23  Future Appointments   Date Time Provider Nataly Emerson   10/5/2023  1:15 PM Tana Loredo DO [facility] [address] [facility] [address]         Non- protocol Medication  RX pended and routed to provider for approval

## 2023-09-13 RX ORDER — CLONAZEPAM 0.5 MG/1
0.5 TABLET ORAL 2 TIMES DAILY PRN
Qty: 45 TABLET | Refills: 0 | Status: SHIPPED | OUTPATIENT
Start: 2023-09-13

## 2023-11-12 ENCOUNTER — E-VISIT (OUTPATIENT)
Dept: TELEHEALTH | Age: 28
End: 2023-11-12
Payer: COMMERCIAL

## 2023-11-12 DIAGNOSIS — R39.89 SUSPECTED UTI: Primary | ICD-10-CM

## 2023-11-12 RX ORDER — CEPHALEXIN 500 MG/1
500 CAPSULE ORAL 2 TIMES DAILY
Qty: 14 CAPSULE | Refills: 0 | Status: SHIPPED | OUTPATIENT
Start: 2023-11-12 | End: 2023-11-19

## 2023-11-12 NOTE — PROGRESS NOTES
Max García is a 25year old female submitting e-visit for UTI symptoms. HPI:   See answers to questionnaire and [employer] message exchange  Reviewed urine cultures back through . All susceptible to cephalexin    Current Outpatient Medications   Medication Sig Dispense Refill    clonazePAM 0.5 MG Oral Tab Take 1 tablet (0.5 mg total) by mouth 2 (two) times daily as needed for Anxiety. 45 tablet 0    sertraline 50 MG Oral Tab Take 1 tablet (50 mg total) by mouth every morning. 90 tablet 2    tretinoin 0.025 % External Cream Apply 1 Application topically nightly. Authorized by: ELIDA AGGARWAL 45 g 1    spironolactone 50 MG Oral Tab Take 1 tablet (50 mg total) by mouth daily. 90 tablet 2    Prenatal Multivit-Min-Fe-FA (PRE-JESSE FORMULA OR) Take by mouth. Docosahexaenoic Acid (DHA COMPLETE OR) Take by mouth.      cyanocobalamin 500 MCG Oral Tab Take 1 tablet (500 mcg total) by mouth daily. albuterol 108 (90 Base) MCG/ACT Inhalation Aero Soln Inhale 2 puffs into the lungs every 4 (four) hours as needed for Wheezing or Shortness of Breath (cough). 18 g 0    vitamin A 3 MG (68339 UT) Oral Cap Take 1 capsule (10,000 Units total) by mouth daily. Lactobacillus-Inulin (CULTURELLE ADULT ULT BALANCE OR) Take 1 tablet by mouth daily. Folic Acid (FOLATE OR) Take 5,000 mcg by mouth daily. Biotin 96977 MCG Oral Tab Take 1 tablet by mouth daily. Vitamin D3, Cholecalciferol, 125 MCG (5000 UT) Oral Cap Take 1 capsule (5,000 Units total) by mouth daily.         Past Medical History:   Diagnosis Date    Allergic rhinitis 21    Anxiety     Asthma     KRYSTIN III (cervical intraepithelial neoplasia grade III) with severe dysplasia     Exercise-induced asthma 2023    H/O LEEP     HGSIL (high grade squamous intraepithelial lesion) on Pap smear of cervix 2022    will see family friend who is a gyne    High grade squamous intraepithelial lesion (HGSIL), grade 3 KRYSTIN, on biopsy of cervix 02/03/2023      Past Surgical History:   Procedure Laterality Date    HERNIA SURGERY  1996      Family History   Problem Relation Age of Onset    Anxiety Mother       Social History:  Social History     Socioeconomic History    Marital status:    Occupational History    Occupation: EMS   Tobacco Use    Smoking status: Never    Smokeless tobacco: Never   Vaping Use    Vaping Use: Never used   Substance and Sexual Activity    Alcohol use: No    Drug use: No    Sexual activity: Yes     Partners: Male   Other Topics Concern    Caffeine Concern No    Exercise No    Seat Belt No    Special Diet No    Stress Concern No    Weight Concern Yes     Comment: Weight loss         ASSESSMENT AND PLAN:     1. Suspected UTI  Pt unable to get to lab today. Pt prefers to start tx today instead of waiting to go to lab tomorrow. Will treat empirically for UTI with med as below  To f/u in person if sx do not improve in 2 days  Sent s/s that require immediate follow up  - cephalexin 500 MG Oral Cap; Take 1 capsule (500 mg total) by mouth 2 (two) times daily for 7 days. Dispense: 14 capsule;  Refill: 0    Duration of  the service:  12 minutes    Refer to [employer] message exchange for specific patient instructions

## 2023-12-10 DIAGNOSIS — F41.1 GAD (GENERALIZED ANXIETY DISORDER): ICD-10-CM

## 2023-12-12 RX ORDER — CLONAZEPAM 0.5 MG/1
0.5 TABLET ORAL 2 TIMES DAILY PRN
Qty: 90 TABLET | Refills: 0 | Status: SHIPPED | OUTPATIENT
Start: 2023-12-12 | End: 2023-12-15

## 2023-12-12 NOTE — TELEPHONE ENCOUNTER
Requesting Clonazepam 0.5mg  LOV: 10/5/23  RTC: 6 months  Last Relevant Labs:   Filled: 10/5/23 #45 with 0 refills    No future appointments. Per [place] , last dispensed 10/18/23 #45 (22 day supply)    Pt requesting 6 month supply.

## 2023-12-14 DIAGNOSIS — F41.1 GAD (GENERALIZED ANXIETY DISORDER): ICD-10-CM

## 2023-12-14 NOTE — TELEPHONE ENCOUNTER
Patient is requesting a refill on: Clonazepam 0.5 mg # 90  Last LOV: 10/5/23  Last Refill:10/18/23  Last relevant labs:  RTC:    Non- protocol Medication  RX pended and routed to provider for approval

## 2023-12-15 RX ORDER — CLONAZEPAM 0.5 MG/1
0.5 TABLET ORAL 2 TIMES DAILY PRN
Qty: 90 TABLET | Refills: 0 | Status: SHIPPED | OUTPATIENT
Start: 2023-12-15

## 2024-01-09 ENCOUNTER — E-VISIT (OUTPATIENT)
Dept: TELEHEALTH | Age: 29
End: 2024-01-09
Payer: COMMERCIAL

## 2024-01-09 DIAGNOSIS — Z02.9 ADMINISTRATIVE ENCOUNTER: Primary | ICD-10-CM

## 2024-01-09 NOTE — PROGRESS NOTES
Patient selected EVisit for back pain but then reported visit was really for increased/severe anxiety.  Advised reaching out directly to PCP office via phone to discuss options. [facility] number also provided.

## 2024-01-12 PROBLEM — G47.00 INSOMNIA: Status: ACTIVE | Noted: 2024-01-12

## 2024-01-15 ENCOUNTER — TELEPHONE (OUTPATIENT)
Dept: FAMILY MEDICINE CLINIC | Facility: CLINIC | Age: 29
End: 2024-01-15

## 2024-01-15 NOTE — TELEPHONE ENCOUNTER
PA submitted via [facility].  All questions answered.  KEY: [id number]  Awaiting on response.

## 2024-01-29 ENCOUNTER — TELEPHONE (OUTPATIENT)
Dept: FAMILY MEDICINE CLINIC | Facility: CLINIC | Age: 29
End: 2024-01-29

## 2024-01-29 NOTE — TELEPHONE ENCOUNTER
Delbert English- Pt is having trouble sleeping and would like to know what options are available.  Pt declined appointment as she said she was told to give you a call with any questions after starting the medication.    Please call  [phone]

## 2024-01-29 NOTE — TELEPHONE ENCOUNTER
Pt states she will schedule a VV appointment.  Offered to assist with scheduling the appointment.  Pt said she will schedule it herself.

## 2024-02-11 DIAGNOSIS — F41.1 GAD (GENERALIZED ANXIETY DISORDER): ICD-10-CM

## 2024-02-13 DIAGNOSIS — F99 INSOMNIA DUE TO OTHER MENTAL DISORDER: ICD-10-CM

## 2024-02-13 DIAGNOSIS — F51.05 INSOMNIA DUE TO OTHER MENTAL DISORDER: ICD-10-CM

## 2024-02-13 RX ORDER — PAROXETINE HYDROCHLORIDE HEMIHYDRATE 25 MG/1
25 TABLET, FILM COATED, EXTENDED RELEASE ORAL EVERY MORNING
Qty: 30 TABLET | Refills: 0 | OUTPATIENT
Start: 2024-02-13 | End: 2024-03-14

## 2024-02-13 RX ORDER — HYDROXYZINE HYDROCHLORIDE 25 MG/1
25 TABLET, FILM COATED ORAL NIGHTLY
Qty: 30 TABLET | Refills: 0 | Status: SHIPPED | OUTPATIENT
Start: 2024-02-13 | End: 2024-03-14

## 2024-02-13 NOTE — TELEPHONE ENCOUNTER
Requested Renewals     Name from pharmacy: Hydroxyzine Hydrochloride 25 Mg Tab Nort         Will file in chart as: HYDROXYZINE 25 MG Oral Tab     Possible duplicate: Hover to review recent actions on this medication    Sig: Take 1 tablet (25 mg total) by mouth at bedtime.    Disp: 30 tablet    Refills: 0    Start: 2/13/2024    Class: Normal    For: Insomnia due to other mental disorder        No future appointments.  LOV: 1/30/24 telemedicine  Last refill given on 1/30/24 for #30    -medication pended for review.

## 2024-02-13 NOTE — TELEPHONE ENCOUNTER
Requested Renewals     Name from pharmacy: Paroxetine Hydrochloride Er 24hr 25 Mg Tab Apot         Will file in chart as: PAROXETINE HCL ER 25 MG Oral Tablet 24 Hr    The original prescription was reordered on 1/17/2024 by Delbert English APRN. Renewing this prescription may not be appropriate.    Sig: Take 1 tablet (25 mg total) by mouth every morning.    Disp: 30 tablet    Refills: 0    Start: 2/11/2024    Class: Normal    Non-formulary For: PAMELA (generalized anxiety disorder)          No future appointments.  LOV: 10/30/24  This medication has been discontinued and was changed to Paroxetine 30mg  This RX denied.

## 2024-03-13 DIAGNOSIS — F43.0 PANIC ATTACK AS REACTION TO STRESS: ICD-10-CM

## 2024-03-13 DIAGNOSIS — F41.1 GAD (GENERALIZED ANXIETY DISORDER): ICD-10-CM

## 2024-03-13 DIAGNOSIS — F41.0 PANIC ATTACK AS REACTION TO STRESS: ICD-10-CM

## 2024-03-13 RX ORDER — PAROXETINE 30 MG/1
30 TABLET, FILM COATED ORAL EVERY MORNING
Qty: 30 TABLET | Refills: 0 | Status: SHIPPED | OUTPATIENT
Start: 2024-03-13 | End: 2024-04-12

## 2024-03-13 RX ORDER — CLONAZEPAM 1 MG/1
1 TABLET ORAL 3 TIMES DAILY PRN
Qty: 90 TABLET | Refills: 0 | Status: SHIPPED | OUTPATIENT
Start: 2024-03-13 | End: 2024-04-12

## 2024-03-13 NOTE — TELEPHONE ENCOUNTER
Requesting     Disp Refills Start End     PARoxetine 30 MG Oral Tab 30 tablet 0 1/17/2024 2/16/2024    Sig - Route: Take 1 tablet (30 mg total) by mouth every morning. - Oral    Sent to pharmacy as: PARoxetine HCl 30 MG Oral Tablet (Paxil)    E-Prescribing Status: Receipt confirmed by pharmacy (1/17/2024  8:03 AM CST)       Disp Refills Start End    clonazePAM 0.5 MG Oral Tab 90 tablet 0 12/15/2023 --    Sig - Route: Take 1 tablet (0.5 mg total) by mouth 2 (two) times daily as needed for Anxiety. - Oral    Sent to pharmacy as: clonazePAM 0.5 MG Oral Tablet (KlonoPIN)    E-Prescribing Status: Receipt confirmed by pharmacy (12/15/2023  9:50 AM CST)      LOV: 1/30/2024 telemed w/Delbert BROWN - f/u med and difficulty sleeping    Filled:     Dispensed Written Strength Quantity Refills Days Supply Provider Pharmacy    PAROXETINE 30 MG TAB NORT 02/13/2024 01/17/2024 30 30 each  30 Delbert English APRN [id number]...         Dispensed Written Strength Quantity Refills Days Supply Provider Pharmacy    CLONAZEPAM 01/30/2024 01/30/2024 1 mg 90  30 IFTIKHAR HATHAWAY [id number]     No future appointments.      Psychiatric Non-Scheduled (Anti-Anxiety) [id number]/13/2024 12:21 PM   Protocol Details In person appointment or virtual visit in the past 6 mos or appointment in next 3 mos    Depression Screening completed within the past 12 months          Controlled Substance Medication [id number]/13/2024 12:21 PM    This medication is a controlled substance - forward to provider to refill          Rx paroxetine sent per protocol  Rx clonazepam pended and routed for approval/denial

## 2024-03-21 DIAGNOSIS — F51.05 INSOMNIA DUE TO OTHER MENTAL DISORDER: ICD-10-CM

## 2024-03-21 DIAGNOSIS — F99 INSOMNIA DUE TO OTHER MENTAL DISORDER: ICD-10-CM

## 2024-03-22 RX ORDER — HYDROXYZINE HYDROCHLORIDE 25 MG/1
25 TABLET, FILM COATED ORAL NIGHTLY
Qty: 30 TABLET | Refills: 0 | Status: SHIPPED | OUTPATIENT
Start: 2024-03-22 | End: 2024-04-21

## 2024-03-22 NOTE — TELEPHONE ENCOUNTER
Requested Renewals     Name from pharmacy: Hydroxyzine Hydrochloride 25 Mg Tab Nort         Will file in chart as: HYDROXYZINE 25 MG Oral Tab    Sig: Take 1 tablet (25 mg total) by mouth at bedtime.    Disp: 30 tablet    Refills: 0    Start: 3/21/2024    Class: Normal    Non-formulary For: Insomnia due to other mental disorder          No future appointments.  LOV: 1/30/24 telemedicine   Last refill given on 2/13/24 for #30    -medication pended for review.

## 2024-03-29 ENCOUNTER — APPOINTMENT (OUTPATIENT)
Dept: CT IMAGING | Age: 29
End: 2024-03-29
Attending: EMERGENCY MEDICINE
Payer: COMMERCIAL

## 2024-03-29 PROCEDURE — 70450 CT HEAD/BRAIN W/O DYE: CPT | Performed by: EMERGENCY MEDICINE

## 2024-04-14 ENCOUNTER — TELEMEDICINE (OUTPATIENT)
Dept: TELEHEALTH | Age: 29
End: 2024-04-14
Payer: COMMERCIAL

## 2024-04-14 DIAGNOSIS — R10.9 ABDOMINAL CRAMPING: ICD-10-CM

## 2024-04-14 DIAGNOSIS — R11.0 NAUSEA: Primary | ICD-10-CM

## 2024-04-14 PROCEDURE — 99213 OFFICE O/P EST LOW 20 MIN: CPT | Performed by: NURSE PRACTITIONER

## 2024-04-14 RX ORDER — ONDANSETRON 4 MG/1
4 TABLET, ORALLY DISINTEGRATING ORAL EVERY 8 HOURS PRN
Qty: 3 TABLET | Refills: 0 | Status: SHIPPED | OUTPATIENT
Start: 2024-04-14

## 2024-04-14 NOTE — PROGRESS NOTES
Virtual/Telephone Check-In    Lynda Sin verbally consents to a Virtual/Telephone Check-In service on 04/14/24.  Patient has been referred to the [facility] website at [email or web address] to review the yearly Consent to Treat document.  Patient understands and accepts financial responsibility for any deductible, co-insurance and/or co-pays associated with this service.       Telehealth Verbal Consent   I conducted a telehealth visit with Lynda Sin today, 04/14/24, which was completed using two-way, real-time interactive audio and video communication. This has been done in good micki to provide continuity of care in the best interest of the provider-patient relationship, due to the COVID - public health crisis/national emergency where restrictions of face-to-face office visits are ongoing. Every conscious effort was taken to allow for sufficient and adequate time to complete the visit.  The patient was made aware of the limitations of the telehealth visit, including treatment limitations as no physical exam could be performed.  The patient was advised to call 911 or to go to the ER in case there was an emergency.  The patient was also advised of the potential privacy & security concerns related to the telehealth platform.   The patient was made aware of where to find [facility]'s notice of privacy practices, telehealth consent form and other related consent forms and documents.  which are located on the [facility] website. The patient verbally agreed to telehealth consent form, related consents and the risks discussed.    Lastly, the patient confirmed that they were in [place].   Included in this visit, time may have been spent reviewing labs, medications, radiology tests and decision making. Appropriate medical decision-making and tests are ordered as detailed in the plan of care above.  Coding/billing information is submitted for this visit based on complexity of care and/or time spent for the visit.    CHIEF  COMPLAINT:     Chief Complaint   Patient presents with    Cough       HPI:   Lynda Sin is a 28 year old female who presents for a video visit.  Patient reports nausea, mild cramping abd pain, fatigue, body aches, throat feels a slightly irritated.  Sx began late last night.  States feels like she wants to vomit at times or have diarrhea but hasn't had either. Poor appetite.  Tolerating small amts of fluids  Denies fever, congestion, runny nose, cough, swollen or tender neck glands, moderate or severe abdominal pain, dizziness, or urine symptoms  Took zofran x 1 dose today from previous script.    History of GI disease/abd pain: denies  No new medications; no change in diet.   Recent travel: denies  Sick contacts: spouse had influenza B on 24; he did not have any GI sx.   Suspicious food: none.  no meal with raw meat or fish in week prior.  Recent antibiotic use: no  Works in  center or health care environment: no    Treating sx with tylenol cold and flu  No known COVID exposure  Flu vaccination this season: no  COVID test taken since sx onset: none taken       Current Outpatient Medications   Medication Sig Dispense Refill    hydrOXYzine 25 MG Oral Tab Take 1 tablet (25 mg total) by mouth at bedtime. 30 tablet 0    clonazePAM 0.5 MG Oral Tab Take 1 tablet (0.5 mg total) by mouth 2 (two) times daily as needed for Anxiety. 90 tablet 0    spironolactone 50 MG Oral Tab Take 1 tablet (50 mg total) by mouth daily. 90 tablet 2    Prenatal Multivit-Min-Fe-FA (PRE- FORMULA OR) Take by mouth.      Docosahexaenoic Acid (DHA COMPLETE OR) Take by mouth.      cyanocobalamin 500 MCG Oral Tab Take 1 tablet (500 mcg total) by mouth daily.      albuterol 108 (90 Base) MCG/ACT Inhalation Aero Soln Inhale 2 puffs into the lungs every 4 (four) hours as needed for Wheezing or Shortness of Breath (cough). 18 g 0    vitamin A 3 MG (49042 UT) Oral Cap Take 1 capsule (10,000 Units total) by mouth daily.       Lactobacillus-Inulin (CULTURELLE ADULT ULT BALANCE OR) Take 1 tablet by mouth daily.      Folic Acid (FOLATE OR) Take 5,000 mcg by mouth daily.      Biotin 43370 MCG Oral Tab Take 1 tablet by mouth daily.      Vitamin D3, Cholecalciferol, 125 MCG (5000 UT) Oral Cap Take 1 capsule (5,000 Units total) by mouth daily.        Past Medical History:    Allergic rhinitis    Anxiety    Asthma (HCC)    KRYSTIN III (cervical intraepithelial neoplasia grade III) with severe dysplasia    Exercise-induced asthma (HCC)    H/O LEEP    HGSIL (high grade squamous intraepithelial lesion) on Pap smear of cervix    will see family friend who is a gyne    High grade squamous intraepithelial lesion (HGSIL), grade 3 KRYSTIN, on biopsy of cervix      Past Surgical History:   Procedure Laterality Date    Hernia surgery  1996         Social History     Socioeconomic History    Marital status:    Occupational History    Occupation: EMS   Tobacco Use    Smoking status: Never    Smokeless tobacco: Never   Vaping Use    Vaping status: Never Used   Substance and Sexual Activity    Alcohol use: No    Drug use: No    Sexual activity: Yes     Partners: Male   Other Topics Concern    Caffeine Concern No    Exercise No    Seat Belt No    Special Diet No    Stress Concern No    Weight Concern Yes     Comment: Weight loss         REVIEW OF SYSTEMS:   GENERAL: poor appetite  SKIN: no rashes or abnormal skin lesions  HEENT: See HPI  LUNGS:  See HPI  CARDIOVASCULAR: see HPI  GI: see HPI  NEURO: See HPI    EXAM:   General: Alert, Well-appearing, and In no acute distress; lying in bed, room somewhat dark  Respiratory:   Speaking in full sentences comfortably  Normal work of breathing  No cough during visit  Head: Normocephalic  Eyes: Conjunctiva clear  Nose: No obvious nasal discharge.  GI: denies tenderness with self palpation  Mood: Affect appropriate    ASSESSMENT AND PLAN:   Lynda Sin is a 28 year old female who presents with symptoms that are  consistent with    ASSESSMENT/PLAN:       Diagnoses and all orders for this visit:    Nausea  -     ondansetron 4 MG Oral Tablet Dispersible; Take 1 tablet (4 mg total) by mouth every 8 (eight) hours as needed for Nausea.    Abdominal cramping        Pt declined influenza or COVID testing  Based on sx and length of time since spouse had influenza, sx likely d/t GI virus   Will treat with medication as listed  Discussed use, dose, and possible side effects  Discussed diet recommendations  Comfort measures as per pt instructions  To f/u with PCP if no improvement in 1-2 days or sooner for new or worsening symptoms  See pt instructions        Patient Instructions   Take ondansetron for nausea and this should help you be able to tolerate fluids.    Start with small sips of fluids.   For hydration you can try:   Oral rehydration solutions such as pedialyte.    Diluted fruit juice along with saltine crackers and broths or soups.    Gatorade   Once able to tolerate solid foods, boiled starches such as potatoes, noodles, rice, wheat, and oat) with salt can be eaten if you have watery diarrhea.  Crackers, bananas, soup, and boiled vegetables are other options.  Foods with high fat content should be avoided until gut function returns to normal.  Advance to general diet as tolerated.    Dairy products (except yogurt) may be difficult to digest in the presence of diarrheal disease.  Temporary avoidance of lactose-containing foods may be helpful.   If you develop diarrhea, start the probiotic Florastor.    If unable to hold down fluids, then go to the ER.    Follow up with your primary care doctor if your symptoms are not improving in 1-2 days or sooner for new or worsening symptoms.  If you currently have a fever,  see your primary care physician if it does not resolve in next 48 hours.  No work until diarrhea, vomiting, and fever free for 24 hours  Call your healthcare provider care right away if you have any of these:    Bloody or black vomit or stools; vomit or stool that contains pus/mucus  Severe, steady belly pain  Vomiting with a severe headache or stiff neck  Vomiting after a head injury  Can't sip liquids after more than 12 hours  Vomiting that lasts more than 24 hours  Severe diarrhea that lasts more than 2 days  Fever over 100.4F does not resolve after 48 hours  Yellowish color to your skin or the whites of your eyes  Can't urinate        Verbalized understanding of instructions        Face to face time spent on Video Visit: 21:10 min  Total Time spent on visit including reviewing history, ordering labs/medication, patient examination and education: 24 min

## 2024-04-14 NOTE — PATIENT INSTRUCTIONS
Take ondansetron for nausea and this should help you be able to tolerate fluids.    Start with small sips of fluids.   For hydration you can try:   Oral rehydration solutions such as pedialyte.    Diluted fruit juice along with saltine crackers and broths or soups.    Gatorade   Once able to tolerate solid foods, boiled starches such as potatoes, noodles, rice, wheat, and oat) with salt can be eaten if you have watery diarrhea.  Crackers, bananas, soup, and boiled vegetables are other options.  Foods with high fat content should be avoided until gut function returns to normal.  Advance to general diet as tolerated.    Dairy products (except yogurt) may be difficult to digest in the presence of diarrheal disease.  Temporary avoidance of lactose-containing foods may be helpful.   If you develop diarrhea, start the probiotic Florastor.    If unable to hold down fluids, then go to the ER.    Follow up with your primary care doctor if your symptoms are not improving in 1-2 days or sooner for new or worsening symptoms.  If you currently have a fever,  see your primary care physician if it does not resolve in next 48 hours.  No work until diarrhea, vomiting, and fever free for 24 hours  Call your healthcare provider care right away if you have any of these:   Bloody or black vomit or stools; vomit or stool that contains pus/mucus  Severe, steady belly pain  Vomiting with a severe headache or stiff neck  Vomiting after a head injury  Can't sip liquids after more than 12 hours  Vomiting that lasts more than 24 hours  Severe diarrhea that lasts more than 2 days  Fever over 100.4F does not resolve after 48 hours  Yellowish color to your skin or the whites of your eyes  Can't urinate

## 2024-04-18 DIAGNOSIS — F41.1 GAD (GENERALIZED ANXIETY DISORDER): ICD-10-CM

## 2024-04-19 RX ORDER — PAROXETINE 30 MG/1
30 TABLET, FILM COATED ORAL EVERY MORNING
Qty: 30 TABLET | Refills: 0 | Status: SHIPPED | OUTPATIENT
Start: 2024-04-19 | End: 2024-05-19

## 2024-04-19 NOTE — TELEPHONE ENCOUNTER
Name from pharmacy: Paroxetine Hydrochloride 30 Mg Tab Nort         Will file in chart as: PAROXETINE 30 MG Oral Tab    Sig: Take 1 tablet (30 mg total) by mouth every morning.    Disp: 30 tablet    Refills: 0    Start: 4/18/2024    Class: Normal    Non-formulary For: PAMELA (generalized anxiety disorder)    Last ordered: 1 month ago (3/13/2024) by KEVIN Meraz    Last refill: 3/13/2024    Rx #: [id number]    Psychiatric Non-Scheduled (Anti-Anxiety) Thsywo1004/18/2024 06:14 PM   Protocol Details In person appointment or virtual visit in the past 6 mos or appointment in next 3 mos    Depression Screening completed within the past 12 months      To be filled at: [id number] [address] [phone], [phone]     LOV: 1/30/2024  Filled: 3/12/2024 #30 with 0 refills    No future appointments.      Refill request approved per protocol.

## 2024-05-07 DIAGNOSIS — F99 INSOMNIA DUE TO OTHER MENTAL DISORDER: ICD-10-CM

## 2024-05-07 DIAGNOSIS — F51.05 INSOMNIA DUE TO OTHER MENTAL DISORDER: ICD-10-CM

## 2024-05-14 RX ORDER — HYDROXYZINE HYDROCHLORIDE 25 MG/1
25 TABLET, FILM COATED ORAL NIGHTLY
Qty: 30 TABLET | Refills: 0 | Status: SHIPPED | OUTPATIENT
Start: 2024-05-14 | End: 2024-05-15

## 2024-05-14 NOTE — TELEPHONE ENCOUNTER
Requesting Hydroxyzine 25mg  Last OV: 1/30/24  RTC: not noted  Last Rx'd 3/22/24 #30 with 0 refills    No future appointments.    Non-protocol med:  Rx pended and routed for approval/denial

## 2024-06-13 ENCOUNTER — E-VISIT (OUTPATIENT)
Dept: TELEHEALTH | Age: 29
End: 2024-06-13
Payer: COMMERCIAL

## 2024-06-13 DIAGNOSIS — J06.9 VIRAL URI: Primary | ICD-10-CM

## 2024-06-13 PROCEDURE — 99421 OL DIG E/M SVC 5-10 MIN: CPT | Performed by: NURSE PRACTITIONER

## 2024-06-13 NOTE — PROGRESS NOTES
Lynda Sin is a 28 year old female.  HPI:   See answers to questions above.   Sinus congestion, cough, ear pain, watery eyes, sneezing.   Current Outpatient Medications   Medication Sig Dispense Refill    hydrOXYzine 50 MG Oral Tab Take 1 tablet (50 mg total) by mouth every 8 (eight) hours as needed for Anxiety. 90 tablet 0    spironolactone 50 MG Oral Tab Take 1 tablet (50 mg total) by mouth daily. 90 tablet 2    clonazePAM 0.5 MG Oral Tab Take 1 tablet (0.5 mg total) by mouth 2 (two) times daily as needed for Anxiety. 90 tablet 0    PARoxetine 30 MG Oral Tab Take 1 tablet (30 mg total) by mouth every morning. Needs appointment prior to further refills. 90 tablet 0    ondansetron 4 MG Oral Tablet Dispersible Take 1 tablet (4 mg total) by mouth every 8 (eight) hours as needed for Nausea. 3 tablet 0    Prenatal Multivit-Min-Fe-FA (PRE- FORMULA OR) Take by mouth.      Docosahexaenoic Acid (DHA COMPLETE OR) Take by mouth.      cyanocobalamin 500 MCG Oral Tab Take 1 tablet (500 mcg total) by mouth daily.      albuterol 108 (90 Base) MCG/ACT Inhalation Aero Soln Inhale 2 puffs into the lungs every 4 (four) hours as needed for Wheezing or Shortness of Breath (cough). 18 g 0    vitamin A 3 MG (55846 UT) Oral Cap Take 1 capsule (10,000 Units total) by mouth daily.      Lactobacillus-Inulin (CULTURELLE ADULT ULT BALANCE OR) Take 1 tablet by mouth daily.      Folic Acid (FOLATE OR) Take 5,000 mcg by mouth daily.      Biotin 70995 MCG Oral Tab Take 1 tablet by mouth daily.      Vitamin D3, Cholecalciferol, 125 MCG (5000 UT) Oral Cap Take 1 capsule (5,000 Units total) by mouth daily.        Past Medical History:    Allergic rhinitis    Anxiety    Asthma (HCC)    KRYSTIN III (cervical intraepithelial neoplasia grade III) with severe dysplasia    Exercise-induced asthma (HCC)    H/O LEEP    HGSIL (high grade squamous intraepithelial lesion) on Pap smear of cervix    will see family friend who is a gyne    High grade squamous  intraepithelial lesion (HGSIL), grade 3 KRYSTIN, on biopsy of cervix      Past Surgical History:   Procedure Laterality Date    Hernia surgery  1996      Family History   Problem Relation Age of Onset    Anxiety Mother       Social History:  Social History     Socioeconomic History    Marital status:    Occupational History    Occupation: EMS   Tobacco Use    Smoking status: Never    Smokeless tobacco: Never   Vaping Use    Vaping status: Never Used   Substance and Sexual Activity    Alcohol use: No    Drug use: No    Sexual activity: Yes     Partners: Male   Other Topics Concern    Caffeine Concern No    Exercise No    Seat Belt No    Special Diet No    Stress Concern No    Weight Concern Yes     Comment: Weight loss         ASSESSMENT AND PLAN:     Encounter Diagnosis   Name Primary?    Viral URI Yes     Provided with comfort measures. See [employer] messages.   Advised ear infection cannot be ruled out through e-visit. Advised to seek in person evaluation if ear pain is persistent/worsening or for any other new/ worsening symptoms.         Meds & Refills for this Visit:  Requested Prescriptions      No prescriptions requested or ordered in this encounter       Duration of  the service:  9 minutes    Patient advised to follow up with PCP if no improvement or worsening of symptoms  Refer to [employer] message for specific patient instructions

## 2024-06-18 DIAGNOSIS — F51.05 INSOMNIA DUE TO OTHER MENTAL DISORDER: ICD-10-CM

## 2024-06-18 DIAGNOSIS — F99 INSOMNIA DUE TO OTHER MENTAL DISORDER: ICD-10-CM

## 2024-06-18 RX ORDER — HYDROXYZINE 50 MG/1
50 TABLET, FILM COATED ORAL EVERY 8 HOURS PRN
Qty: 90 TABLET | Refills: 0 | Status: SHIPPED | OUTPATIENT
Start: 2024-06-18 | End: 2024-07-18

## 2024-06-18 NOTE — TELEPHONE ENCOUNTER
Requesting Hydroxyzine 50mg  Last OV: 5/15/24  RTC: prn  Last Rx'd 5/15/24 #90 with 0 refills    No future appointments.    Non-protocol med:  Rx pended and routed for approval/denial

## 2024-08-05 DIAGNOSIS — F51.05 INSOMNIA DUE TO OTHER MENTAL DISORDER: ICD-10-CM

## 2024-08-05 DIAGNOSIS — F41.1 GAD (GENERALIZED ANXIETY DISORDER): ICD-10-CM

## 2024-08-05 DIAGNOSIS — F99 INSOMNIA DUE TO OTHER MENTAL DISORDER: ICD-10-CM

## 2024-08-06 RX ORDER — CLONAZEPAM 0.5 MG/1
0.5 TABLET ORAL 2 TIMES DAILY PRN
Qty: 90 TABLET | Refills: 0 | Status: SHIPPED | OUTPATIENT
Start: 2024-08-06

## 2024-08-06 RX ORDER — HYDROXYZINE 50 MG/1
50 TABLET, FILM COATED ORAL EVERY 8 HOURS PRN
Qty: 90 TABLET | Refills: 0 | Status: SHIPPED | OUTPATIENT
Start: 2024-08-06 | End: 2024-09-05

## 2024-08-06 RX ORDER — PAROXETINE 30 MG/1
30 TABLET, FILM COATED ORAL EVERY MORNING
Qty: 90 TABLET | Refills: 0 | Status: SHIPPED | OUTPATIENT
Start: 2024-08-06 | End: 2024-11-04

## 2024-08-06 NOTE — TELEPHONE ENCOUNTER
Requesting Paroxetine 30mg  Last Rx'd 5/15/24 #90 with 0 refills    Requesting Clonazepam 0.5mg  Last Rx'd 5/15/24 #90 with 0 refills    Requesting Hydroxyzine 50mg  Last Rx'd 5/15/24 #90 with 0 refills    Last OV: 5/15/24  RTC: not noted    No future appointments.        Controlled med:  Rx pended and routed for approval/denial

## 2024-08-18 ENCOUNTER — E-VISIT (OUTPATIENT)
Dept: TELEHEALTH | Age: 29
End: 2024-08-18
Payer: COMMERCIAL

## 2024-08-18 DIAGNOSIS — R39.9 UTI SYMPTOMS: Primary | ICD-10-CM

## 2024-08-20 ENCOUNTER — LAB ENCOUNTER (OUTPATIENT)
Dept: LAB | Age: 29
End: 2024-08-20
Attending: NURSE PRACTITIONER
Payer: COMMERCIAL

## 2024-08-20 DIAGNOSIS — R39.9 UTI SYMPTOMS: ICD-10-CM

## 2024-08-20 LAB
BILIRUB UR QL STRIP.AUTO: NEGATIVE
CLARITY UR REFRACT.AUTO: CLEAR
COLOR UR AUTO: YELLOW
GLUCOSE UR STRIP.AUTO-MCNC: NORMAL MG/DL
KETONES UR STRIP.AUTO-MCNC: NEGATIVE MG/DL
LEUKOCYTE ESTERASE UR QL STRIP.AUTO: NEGATIVE
NITRITE UR QL STRIP.AUTO: NEGATIVE
PH UR STRIP.AUTO: 7 [PH] (ref 5–8)
PROT UR STRIP.AUTO-MCNC: NEGATIVE MG/DL
RBC UR QL AUTO: NEGATIVE
SP GR UR STRIP.AUTO: 1.02 (ref 1–1.03)
UROBILINOGEN UR STRIP.AUTO-MCNC: NORMAL MG/DL

## 2024-08-20 PROCEDURE — 87086 URINE CULTURE/COLONY COUNT: CPT

## 2024-08-20 PROCEDURE — 81003 URINALYSIS AUTO W/O SCOPE: CPT

## 2024-08-20 RX ORDER — CEPHALEXIN 500 MG/1
500 CAPSULE ORAL 2 TIMES DAILY
Qty: 14 CAPSULE | Refills: 0 | Status: SHIPPED | OUTPATIENT
Start: 2024-08-20 | End: 2024-08-27

## 2024-08-20 NOTE — PROGRESS NOTES
Lynda Sin is a 28 year old female submitting e-visit for urinary symptoms.  HPI:   See answers to questionnaire and [employer] message exchange  Freq UTIs in .  Last UTI 2023 per epic.  Last urine cx 2023 + for group B strep.  In  culture + for e coli.     Current Outpatient Medications   Medication Sig Dispense Refill    PAROXETINE 30 MG Oral Tab Take 1 tablet (30 mg total) by mouth every morning. Needs appointment prior to further refills. 90 tablet 0    CLONAZEPAM 0.5 MG Oral Tab Take 1 tablet (0.5 mg total) by mouth 2 (two) times daily as needed for Anxiety. 90 tablet 0    HYDROXYZINE 50 MG Oral Tab Take 1 tablet (50 mg total) by mouth every 8 (eight) hours as needed for Anxiety. 90 tablet 0    spironolactone 50 MG Oral Tab Take 1 tablet (50 mg total) by mouth daily. 90 tablet 2    ondansetron 4 MG Oral Tablet Dispersible Take 1 tablet (4 mg total) by mouth every 8 (eight) hours as needed for Nausea. 3 tablet 0    Prenatal Multivit-Min-Fe-FA (PRE- FORMULA OR) Take by mouth.      Docosahexaenoic Acid (DHA COMPLETE OR) Take by mouth.      cyanocobalamin 500 MCG Oral Tab Take 1 tablet (500 mcg total) by mouth daily.      albuterol 108 (90 Base) MCG/ACT Inhalation Aero Soln Inhale 2 puffs into the lungs every 4 (four) hours as needed for Wheezing or Shortness of Breath (cough). 18 g 0    vitamin A 3 MG (13982 UT) Oral Cap Take 1 capsule (10,000 Units total) by mouth daily.      Lactobacillus-Inulin (CULTURELLE ADULT ULT BALANCE OR) Take 1 tablet by mouth daily.      Folic Acid (FOLATE OR) Take 5,000 mcg by mouth daily.      Biotin 03776 MCG Oral Tab Take 1 tablet by mouth daily.      Vitamin D3, Cholecalciferol, 125 MCG (5000 UT) Oral Cap Take 1 capsule (5,000 Units total) by mouth daily.        Past Medical History:    Allergic rhinitis    Anxiety    Asthma (HCC)    KRYSTIN III (cervical intraepithelial neoplasia grade III) with severe dysplasia    Exercise-induced asthma (HCC)    H/O LEEP    HGSIL  (high grade squamous intraepithelial lesion) on Pap smear of cervix    will see family friend who is a gyne    High grade squamous intraepithelial lesion (HGSIL), grade 3 KRYSTIN, on biopsy of cervix      Past Surgical History:   Procedure Laterality Date    Hernia surgery  1996      Family History   Problem Relation Age of Onset    Anxiety Mother       Social History:  Social History     Socioeconomic History    Marital status:    Occupational History    Occupation: EMS   Tobacco Use    Smoking status: Never    Smokeless tobacco: Never   Vaping Use    Vaping status: Never Used   Substance and Sexual Activity    Alcohol use: No    Drug use: No    Sexual activity: Yes     Partners: Male   Other Topics Concern    Caffeine Concern No    Exercise No    Seat Belt No    Special Diet No    Stress Concern No    Weight Concern Yes     Comment: Weight loss         ASSESSMENT AND PLAN:       Diagnoses and all orders for this visit:    UTI symptoms  -     Urinalysis, Routine; Future  -     Urine Culture, Routine; Future  -     cephalexin 500 MG Oral Cap; Take 1 capsule (500 mg total) by mouth 2 (two) times daily for 7 days.      UA still in process near end of shift; cephalexin sent to pharmacy. Will treat empirically while awaiting result.   Risk of hyperkalemia with spironolactone and bactrim or macrobid  Result rec'd near end of shift; no abnormal UA findings.  Advised pt NOT to start antibiotic at this time.  Will await urine cx result.   Push fluids  Follow up if no improvement in 2 days or sooner for new or worsening symptoms  Seek immediate care for fever, nausea/vomiting, flank pain  Addendum 8/22/24 - urine culture negative; no antibiotic indicated; MCM sent with result  Refer to [employer] exchange for specific patient instructions      Duration of the E-visit service:  12 minutes

## 2024-08-22 PROCEDURE — 99422 OL DIG E/M SVC 11-20 MIN: CPT | Performed by: NURSE PRACTITIONER

## 2024-09-18 DIAGNOSIS — F41.1 GAD (GENERALIZED ANXIETY DISORDER): ICD-10-CM

## 2024-09-18 DIAGNOSIS — F99 INSOMNIA DUE TO OTHER MENTAL DISORDER: ICD-10-CM

## 2024-09-18 DIAGNOSIS — F51.05 INSOMNIA DUE TO OTHER MENTAL DISORDER: ICD-10-CM

## 2024-09-19 RX ORDER — PAROXETINE 30 MG/1
30 TABLET, FILM COATED ORAL NIGHTLY
Qty: 90 TABLET | Refills: 0 | OUTPATIENT
Start: 2024-09-19

## 2024-09-19 RX ORDER — HYDROXYZINE HYDROCHLORIDE 25 MG/1
25 TABLET, FILM COATED ORAL NIGHTLY
Qty: 30 TABLET | Refills: 0 | OUTPATIENT
Start: 2024-09-19 | End: 2024-10-19

## 2024-09-19 NOTE — TELEPHONE ENCOUNTER
Requesting Clonazepam 0.5mg  Last Rx'd 8/6/24 #90 with 0 refills    Requesting Hydroxyzine 25mg    Last OV: 5/15/24 Telemedicine  RTC: prn    No future appointments.    Per [place] , Clonazepam last dispensed 8/6/24 #90    -Hydroxyzine 25mg was increased to 50mg    Controlled med:  Rx pended and routed for approval/denial

## 2024-09-20 RX ORDER — CLONAZEPAM 0.5 MG/1
0.5 TABLET ORAL 2 TIMES DAILY PRN
Qty: 90 TABLET | Refills: 0 | Status: SHIPPED | OUTPATIENT
Start: 2024-09-20

## 2024-09-23 ENCOUNTER — HOSPITAL ENCOUNTER (EMERGENCY)
Age: 29
Discharge: HOME | End: 2024-09-24
Attending: EMERGENCY MEDICINE
Payer: COMMERCIAL

## 2024-09-23 DIAGNOSIS — N83.202 CYSTS OF BOTH OVARIES: ICD-10-CM

## 2024-09-23 DIAGNOSIS — N83.201 CYSTS OF BOTH OVARIES: ICD-10-CM

## 2024-09-23 DIAGNOSIS — O20.0 THREATENED MISCARRIAGE (HCC): Primary | ICD-10-CM

## 2024-09-23 PROCEDURE — 99284 EMERGENCY DEPT VISIT MOD MDM: CPT

## 2024-09-23 PROCEDURE — 36415 COLL VENOUS BLD VENIPUNCTURE: CPT

## 2024-09-23 RX ORDER — LETROZOLE 2.5 MG/1
2.5 TABLET, FILM COATED ORAL DAILY
COMMUNITY

## 2024-09-24 ENCOUNTER — APPOINTMENT (OUTPATIENT)
Dept: ULTRASOUND IMAGING | Age: 29
End: 2024-09-24
Attending: EMERGENCY MEDICINE
Payer: COMMERCIAL

## 2024-09-24 VITALS
HEIGHT: 64 IN | RESPIRATION RATE: 17 BRPM | DIASTOLIC BLOOD PRESSURE: 69 MMHG | HEART RATE: 90 BPM | SYSTOLIC BLOOD PRESSURE: 115 MMHG | BODY MASS INDEX: 27.31 KG/M2 | WEIGHT: 160 LBS | TEMPERATURE: 98 F | OXYGEN SATURATION: 98 %

## 2024-09-24 LAB
ALBUMIN SERPL-MCNC: 4 G/DL (ref 3.4–5)
ALBUMIN/GLOB SERPL: 1.2 {RATIO} (ref 1–2)
ALP LIVER SERPL-CCNC: 62 U/L
ALT SERPL-CCNC: 28 U/L
ANION GAP SERPL CALC-SCNC: 6 MMOL/L (ref 0–18)
AST SERPL-CCNC: 14 U/L (ref 15–37)
B-HCG SERPL-ACNC: 731 MIU/ML
BASOPHILS # BLD AUTO: 0.03 X10(3) UL (ref 0–0.2)
BASOPHILS NFR BLD AUTO: 0.3 %
BILIRUB SERPL-MCNC: 0.2 MG/DL (ref 0.1–2)
BUN BLD-MCNC: 11 MG/DL (ref 9–23)
CALCIUM BLD-MCNC: 9.3 MG/DL (ref 8.5–10.1)
CHLORIDE SERPL-SCNC: 104 MMOL/L (ref 98–112)
CO2 SERPL-SCNC: 27 MMOL/L (ref 21–32)
CREAT BLD-MCNC: 1.02 MG/DL
EGFRCR SERPLBLD CKD-EPI 2021: 76 ML/MIN/1.73M2 (ref 60–?)
EOSINOPHIL # BLD AUTO: 0.05 X10(3) UL (ref 0–0.7)
EOSINOPHIL NFR BLD AUTO: 0.5 %
ERYTHROCYTE [DISTWIDTH] IN BLOOD BY AUTOMATED COUNT: 12.8 %
GLOBULIN PLAS-MCNC: 3.3 G/DL (ref 2.8–4.4)
GLUCOSE BLD-MCNC: 116 MG/DL (ref 70–99)
HCT VFR BLD AUTO: 38.6 %
HGB BLD-MCNC: 13.7 G/DL
IMM GRANULOCYTES # BLD AUTO: 0.03 X10(3) UL (ref 0–1)
IMM GRANULOCYTES NFR BLD: 0.3 %
LYMPHOCYTES # BLD AUTO: 2.87 X10(3) UL (ref 1–4)
LYMPHOCYTES NFR BLD AUTO: 29.3 %
MCH RBC QN AUTO: 29.4 PG (ref 26–34)
MCHC RBC AUTO-ENTMCNC: 35.5 G/DL (ref 31–37)
MCV RBC AUTO: 82.8 FL
MONOCYTES # BLD AUTO: 0.68 X10(3) UL (ref 0.1–1)
MONOCYTES NFR BLD AUTO: 6.9 %
NEUTROPHILS # BLD AUTO: 6.15 X10 (3) UL (ref 1.5–7.7)
NEUTROPHILS # BLD AUTO: 6.15 X10(3) UL (ref 1.5–7.7)
NEUTROPHILS NFR BLD AUTO: 62.7 %
OSMOLALITY SERPL CALC.SUM OF ELEC: 284 MOSM/KG (ref 275–295)
PLATELET # BLD AUTO: 223 10(3)UL (ref 150–450)
POTASSIUM SERPL-SCNC: 3.7 MMOL/L (ref 3.5–5.1)
PROT SERPL-MCNC: 7.3 G/DL (ref 6.4–8.2)
RBC # BLD AUTO: 4.66 X10(6)UL
RH BLOOD TYPE: POSITIVE
SODIUM SERPL-SCNC: 137 MMOL/L (ref 136–145)
WBC # BLD AUTO: 9.8 X10(3) UL (ref 4–11)

## 2024-09-24 PROCEDURE — 80053 COMPREHEN METABOLIC PANEL: CPT | Performed by: EMERGENCY MEDICINE

## 2024-09-24 PROCEDURE — 76817 TRANSVAGINAL US OBSTETRIC: CPT | Performed by: EMERGENCY MEDICINE

## 2024-09-24 PROCEDURE — 84702 CHORIONIC GONADOTROPIN TEST: CPT | Performed by: EMERGENCY MEDICINE

## 2024-09-24 PROCEDURE — 86900 BLOOD TYPING SEROLOGIC ABO: CPT | Performed by: EMERGENCY MEDICINE

## 2024-09-24 PROCEDURE — 85025 COMPLETE CBC W/AUTO DIFF WBC: CPT | Performed by: EMERGENCY MEDICINE

## 2024-09-24 PROCEDURE — 76801 OB US < 14 WKS SINGLE FETUS: CPT | Performed by: EMERGENCY MEDICINE

## 2024-09-24 PROCEDURE — 86901 BLOOD TYPING SEROLOGIC RH(D): CPT | Performed by: EMERGENCY MEDICINE

## 2024-09-24 NOTE — DISCHARGE INSTRUCTIONS
Follow-up with your OB/GYN.  If you do not have one, follow-up with Dr. Grey  Take Tylenol as needed for pain  Follow-up with OB/GYN for repeat quantitative hCG and pelvic ultrasound as an outpatient  Your blood type is a positive  Return to the emergency department for any worsening symptoms or new concern

## 2024-09-24 NOTE — ED PROVIDER NOTES
Patient Seen in: [facility] In [place]      History     Chief Complaint   Patient presents with    Eval-G     Stated Complaint: Took pregnancy test today and it was positive but is having some bleeding and s*    Subjective:   HPI    28-year-old female presents to the emergency department with complaints of abdominal bloating and some spotting.  Patient took a home pregnancy test which was positive.  Her last menstrual period was September 1 but she states that her periods are always very light.  She denies any chest pain or shortness of breath.  This is her first pregnancy.  She denies any other exacerbating leaving factors    Objective:   Past Medical History:    Allergic rhinitis    Anxiety    Asthma (HCC)    KRYSTIN III (cervical intraepithelial neoplasia grade III) with severe dysplasia    Exercise-induced asthma (HCC)    H/O LEEP    HGSIL (high grade squamous intraepithelial lesion) on Pap smear of cervix    will see family friend who is a gyne    High grade squamous intraepithelial lesion (HGSIL), grade 3 KRYSTIN, on biopsy of cervix              Past Surgical History:   Procedure Laterality Date    Hernia surgery  1996                Social History     Socioeconomic History    Marital status:    Occupational History    Occupation: EMS   Tobacco Use    Smoking status: Never    Smokeless tobacco: Never   Vaping Use    Vaping status: Never Used   Substance and Sexual Activity    Alcohol use: No    Drug use: No    Sexual activity: Yes     Partners: Male   Other Topics Concern    Caffeine Concern No    Exercise No    Seat Belt No    Special Diet No    Stress Concern No    Weight Concern Yes     Comment: Weight loss              Review of Systems    Positive for stated Chief Complaint: Eval-G    Other systems are as noted in HPI.  Constitutional and vital signs reviewed.      All other systems reviewed and negative except as noted above.    Physical Exam     ED Triage Vitals [09/23/24 2355]    BP (!) 122/97   Pulse 105   Resp 16   Temp 97.7 °F (36.5 °C)   Temp src    SpO2 100 %   O2 Device None (Room air)       Current Vitals:   Vital Signs  BP: (!) 122/97  Pulse: 105  Resp: 16  Temp: 97.7 °F (36.5 °C)    Oxygen Therapy  SpO2: 100 %  O2 Device: None (Room air)            Physical Exam    General: Alert and oriented. No acute distress.  HEENT: Normocephalic. No evidence of trauma. Extraocular movements are intact.  Cardiovascular exam: Regular rate and rhythm  Lungs: Clear to auscultation bilaterally.  Abdomen: Soft, nondistended, nontender.  Extremities: No evidence of deformity. No clubbing or cyanosis.  Neuro: No focal deficit is noted.    ED Course     Labs Reviewed   COMP METABOLIC PANEL (14) - Abnormal; Notable for the following components:       Result Value    Glucose 116 (*)     AST 14 (*)     All other components within normal limits   HCG, BETA SUBUNIT (QUANT PREGNANCY TEST) - Abnormal; Notable for the following components:    Hcg Quantitative 731.0 (*)     All other components within normal limits   CBC WITH DIFFERENTIAL WITH PLATELET   ABORH (BLOOD TYPE)     History is provided by the patient  Differential diagnosis includes a threatened miscarriage versus missed AB versus ectopic pregnancy  Past medical history includes anxiety, asthma, cervical neoplasia  Patient had blood drawn including CBC, blood type, quantitative hCG  Pelvic ultrasound was ordered for further evaluate  Pelvic ultrasound shows bilateral ovarian cyst.  3.2 cm on the right and 2 0.4 cm on the left.  No adnexal masses or free fluid.  No intrauterine gestational sac.  Endometrial stripe is 11 mm.  Doppler flow is seen in both ovaries    Review of lab work shows the patient is a positive.  CBC is within normal limits.  Complete metabolic panel within normal limits.  Quantitative hCG 731  Suspect that patient may be very early In her gestation so an IUP cannot be visualized.  Cannot completely rule out an ectopic pregnancy.   Patient will require follow-up serial quantitative hCGs and pelvic ultrasound as an outpatient     MDM      Patient was screened and evaluated during this visit.   As a treating physician attending to the patient, I determined, within reasonable clinical confidence and prior to discharge, that an emergency medical condition was not or was no longer present.  There was no indication for further evaluation, treatment or admission on an emergency basis.  Comprehensive verbal and written discharge and follow-up instructions were provided to help prevent relapse or worsening.  Patient was instructed to follow-up with her primary care provider for further evaluation and treatment, but to return immediately to the ER for worsening, concerning, new, changing or persisting symptoms.  I discussed the case with the patient and they had no questions, complaints, or concerns.  Patient felt comfortable going home.    ^^Please note that this report has been produced using speech recognition software and may contain errors related to that system including, but not limited to, errors in grammar, punctuation, and spelling, as well as words and phrases that possibly may have been recognized inappropriately.  If there are any questions or concerns, contact the dictating provider for clarification                                 Medical Decision Making      Disposition and Plan     Clinical Impression:  No diagnosis found.     Disposition:  There is no disposition on file for this visit.  There is no disposition time on file for this visit.    Follow-up:  No follow-up provider specified.        Medications Prescribed:  Current Discharge Medication List

## 2024-09-24 NOTE — ED INITIAL ASSESSMENT (HPI)
Pt to ED with vaginal bleeding x1 week, today noted when wiping, + abdominal cramping. Pt had +pregnancy test, LMP 9/1/2024.

## 2024-09-30 ENCOUNTER — APPOINTMENT (OUTPATIENT)
Dept: ULTRASOUND IMAGING | Facility: HOSPITAL | Age: 29
End: 2024-09-30
Attending: EMERGENCY MEDICINE
Payer: COMMERCIAL

## 2024-09-30 ENCOUNTER — HOSPITAL ENCOUNTER (EMERGENCY)
Facility: HOSPITAL | Age: 29
Discharge: HOME | End: 2024-09-30
Attending: EMERGENCY MEDICINE
Payer: COMMERCIAL

## 2024-09-30 VITALS
RESPIRATION RATE: 18 BRPM | OXYGEN SATURATION: 99 % | TEMPERATURE: 99 F | BODY MASS INDEX: 27 KG/M2 | SYSTOLIC BLOOD PRESSURE: 125 MMHG | WEIGHT: 155.19 LBS | DIASTOLIC BLOOD PRESSURE: 91 MMHG | HEART RATE: 91 BPM

## 2024-09-30 DIAGNOSIS — O00.90 ECTOPIC PREGNANCY, UNSPECIFIED LOCATION, UNSPECIFIED WHETHER INTRAUTERINE PREGNANCY PRESENT (HCC): Primary | ICD-10-CM

## 2024-09-30 LAB
ALBUMIN SERPL-MCNC: 5 G/DL (ref 3.2–4.8)
ALBUMIN/GLOB SERPL: 1.9 {RATIO} (ref 1–2)
ALP LIVER SERPL-CCNC: 67 U/L
ALT SERPL-CCNC: 18 U/L
ANION GAP SERPL CALC-SCNC: 5 MMOL/L (ref 0–18)
AST SERPL-CCNC: 18 U/L (ref ?–34)
B-HCG SERPL-ACNC: 2481.3 MIU/ML
BASOPHILS # BLD AUTO: 0.02 X10(3) UL (ref 0–0.2)
BASOPHILS NFR BLD AUTO: 0.2 %
BILIRUB SERPL-MCNC: 0.3 MG/DL (ref 0.3–1.2)
BUN BLD-MCNC: 10 MG/DL (ref 9–23)
CALCIUM BLD-MCNC: 9.9 MG/DL (ref 8.7–10.4)
CHLORIDE SERPL-SCNC: 106 MMOL/L (ref 98–112)
CO2 SERPL-SCNC: 25 MMOL/L (ref 21–32)
CREAT BLD-MCNC: 0.74 MG/DL
EGFRCR SERPLBLD CKD-EPI 2021: 111 ML/MIN/1.73M2 (ref 60–?)
EOSINOPHIL # BLD AUTO: 0.02 X10(3) UL (ref 0–0.7)
EOSINOPHIL NFR BLD AUTO: 0.2 %
ERYTHROCYTE [DISTWIDTH] IN BLOOD BY AUTOMATED COUNT: 12.8 %
GLOBULIN PLAS-MCNC: 2.7 G/DL (ref 2–3.5)
GLUCOSE BLD-MCNC: 130 MG/DL (ref 70–99)
HCT VFR BLD AUTO: 39.6 %
HGB BLD-MCNC: 14.4 G/DL
IMM GRANULOCYTES # BLD AUTO: 0.07 X10(3) UL (ref 0–1)
IMM GRANULOCYTES NFR BLD: 0.7 %
LYMPHOCYTES # BLD AUTO: 1.41 X10(3) UL (ref 1–4)
LYMPHOCYTES NFR BLD AUTO: 13.2 %
MCH RBC QN AUTO: 29.3 PG (ref 26–34)
MCHC RBC AUTO-ENTMCNC: 36.4 G/DL (ref 31–37)
MCV RBC AUTO: 80.7 FL
MONOCYTES # BLD AUTO: 0.65 X10(3) UL (ref 0.1–1)
MONOCYTES NFR BLD AUTO: 6.1 %
NEUTROPHILS # BLD AUTO: 8.48 X10 (3) UL (ref 1.5–7.7)
NEUTROPHILS # BLD AUTO: 8.48 X10(3) UL (ref 1.5–7.7)
NEUTROPHILS NFR BLD AUTO: 79.6 %
OSMOLALITY SERPL CALC.SUM OF ELEC: 283 MOSM/KG (ref 275–295)
PLATELET # BLD AUTO: 235 10(3)UL (ref 150–450)
POTASSIUM SERPL-SCNC: 3.9 MMOL/L (ref 3.5–5.1)
PROT SERPL-MCNC: 7.7 G/DL (ref 5.7–8.2)
RBC # BLD AUTO: 4.91 X10(6)UL
SODIUM SERPL-SCNC: 136 MMOL/L (ref 136–145)
WBC # BLD AUTO: 10.7 X10(3) UL (ref 4–11)

## 2024-09-30 PROCEDURE — 84702 CHORIONIC GONADOTROPIN TEST: CPT | Performed by: EMERGENCY MEDICINE

## 2024-09-30 PROCEDURE — 80053 COMPREHEN METABOLIC PANEL: CPT | Performed by: EMERGENCY MEDICINE

## 2024-09-30 PROCEDURE — 84703 CHORIONIC GONADOTROPIN ASSAY: CPT

## 2024-09-30 PROCEDURE — 76817 TRANSVAGINAL US OBSTETRIC: CPT | Performed by: EMERGENCY MEDICINE

## 2024-09-30 PROCEDURE — 85025 COMPLETE CBC W/AUTO DIFF WBC: CPT | Performed by: EMERGENCY MEDICINE

## 2024-09-30 PROCEDURE — 96372 THER/PROPH/DIAG INJ SC/IM: CPT

## 2024-09-30 PROCEDURE — 84703 CHORIONIC GONADOTROPIN ASSAY: CPT | Performed by: EMERGENCY MEDICINE

## 2024-09-30 PROCEDURE — 99285 EMERGENCY DEPT VISIT HI MDM: CPT

## 2024-09-30 PROCEDURE — 80053 COMPREHEN METABOLIC PANEL: CPT

## 2024-09-30 PROCEDURE — 84702 CHORIONIC GONADOTROPIN TEST: CPT

## 2024-09-30 PROCEDURE — 85025 COMPLETE CBC W/AUTO DIFF WBC: CPT

## 2024-09-30 PROCEDURE — 76801 OB US < 14 WKS SINGLE FETUS: CPT | Performed by: EMERGENCY MEDICINE

## 2024-09-30 NOTE — ED PROVIDER NOTES
Patient Seen in: [facility]      History     Chief Complaint   Patient presents with    Eval-G     Stated Complaint: r/o ectopic    Subjective:   HPI    This is a 29-year-old woman, here for evaluation possible ectopic pregnancy saw her gynecologist earlier today, had a possibly abnormal ultrasound, concern for ectopic, sent to the ER for repeat beta-hCG and possible methotrexate.  Patient states she has had spotting for the past 2 weeks,  intermittent cramping, no significant discomfort at present no other complaints or concerns.    Objective:   Past Medical History:    Allergic rhinitis    Anxiety    Asthma (HCC)    KRYSTIN III (cervical intraepithelial neoplasia grade III) with severe dysplasia    Exercise-induced asthma (HCC)    H/O LEEP    HGSIL (high grade squamous intraepithelial lesion) on Pap smear of cervix    will see family friend who is a gyne    High grade squamous intraepithelial lesion (HGSIL), grade 3 KRYSTIN, on biopsy of cervix              Past Surgical History:   Procedure Laterality Date    Hernia surgery  1996                Social History     Socioeconomic History    Marital status:    Occupational History    Occupation: EMS   Tobacco Use    Smoking status: Never    Smokeless tobacco: Never   Vaping Use    Vaping status: Never Used   Substance and Sexual Activity    Alcohol use: No    Drug use: No    Sexual activity: Yes     Partners: Male   Other Topics Concern    Caffeine Concern No    Exercise No    Seat Belt No    Special Diet No    Stress Concern No    Weight Concern Yes     Comment: Weight loss              Review of Systems    Positive for stated Chief Complaint: Eval-G    Other systems are as noted in HPI.  Constitutional and vital signs reviewed.      All other systems reviewed and negative except as noted above.    Physical Exam     ED Triage Vitals [09/30/24 1448]   /90   Pulse 102   Resp 18   Temp 99 °F (37.2 °C)   Temp src Oral   SpO2 97 %   O2  Device None (Room air)       Current Vitals:   Vital Signs  BP: 122/86  Pulse: 88  Resp: 18  Temp: 99 °F (37.2 °C)  Temp src: Oral  MAP (mmHg): 96    Oxygen Therapy  SpO2: 99 %  O2 Device: None (Room air)            Physical Exam        Physical Exam  Vitals signs and nursing note reviewed.   General:  Patient laying supine in the bed in no acute distress  Head: Normocephalic and atraumatic.   HEENT:  Mucous membranes are moist.   Cardiovascular:  Normal rate and regular rhythm.  No Edema  Pulmonary:  Pulmonary effort is normal.  Normal breath sounds. No wheezing, rhonchi or rales.   Abdominal: Soft nontender nondistended, normal bowel sounds, no guarding no rebound tenderness  Skin: Warm and dry  Neurological: Awake alert, speech is normal        ED Course     Labs Reviewed   CBC WITH DIFFERENTIAL WITH PLATELET - Abnormal; Notable for the following components:       Result Value    Neutrophil Absolute Prelim 8.48 (*)     Neutrophil Absolute 8.48 (*)     All other components within normal limits   COMP METABOLIC PANEL (14) - Abnormal; Notable for the following components:    Glucose 130 (*)     Albumin 5.0 (*)     All other components within normal limits   HCG, BETA SUBUNIT (QUANT PREGNANCY TEST) - Abnormal; Notable for the following components:    Hcg Quantitative 2,481.3 (*)     All other components within normal limits   RAINBOW DRAW LAVENDER   RAINBOW DRAW LIGHT GREEN              PREG 1ST TRIM W/EV (CPT=76801/30718)    Result Date: 9/30/2024  PROCEDURE:   OB W/ EV 1ST TRIMESTER (CPT=76801/69715)  COMPARISON:  [facility],  PREG 1ST TRIM W/EV (CPT=76801/69799), 9/24/2024, 0:41 AM.  INDICATIONS:  Pregnant with abdominal pain, 4 weeks 1 day based on LMP  TECHNIQUE:  Transabdominal and endovaginal pelvic ultrasound examinations were performed.  PATIENT STATED HISTORY: (As transcribed by Technologist)        FINDINGS:   The uterus measures 8.1 x 3.6 x 5.1 cm, anteverted in configuration.  No uterine  fibroids identified.  Homogeneous thickening of the endometrium measuring 10 mm at the fundus.  No intrauterine gestational sac, yolk sac, or fetal pole identified.  The right ovary measures 4.1 x 2.6 x 3.7 cm. Within the right ovary is a complex structure measuring 2.1 x 1.4 x 2.0 cm containing thin septation and low-level echoes without internal vascularity.  This is favored to represent hemorrhagic cyst.  The left ovary measures 3.0 x 1.9 x 2.0 cm. Within the left ovary is a complex structure measuring 1.2 x 1.0 x 1.2 cm, also demonstrating thin septation without intralesional vascularity.  This is favored to represent a 2nd hemorrhagic cyst.  Normal ovarian vascularity bilaterally on color Doppler interrogation.  Few  subcentimeter cervical nabothian cysts noted.  No free fluid within the pelvis.            CONCLUSION:   1. No intrauterine gestational sac, yolk sac, or fetal pole identified.  In the setting of positive beta HCG, diagnostic considerations include early IUP, failed IUP, and ectopic gestation.  Continued sonographic and beta hCG follow-up recommended.  2. There are single complex structures within the right and left ovary, dimensions as above.  Each of these demonstrates sonographic features favoring hemorrhagic cyst.    LOCATION:  [facility]   Dictated by (CST): Lana Blair MD on 9/30/2024 at 6:35 PM     Finalized by (CST): Lana Blair MD on 9/30/2024 at 6:38 PM       US PREG 1ST TRIM W/EV (CPT=76801/44932)    Result Date: 9/24/2024  PROCEDURE:  US OB W/ EV 1ST TRIMESTER (CPT=76801/82101)  COMPARISON:  None.  INDICATIONS:  Took pregnancy test today and it was positive but is having some bleeding and some pain. LMP was Sept 1st and it was super light and short.  TECHNIQUE:  Transabdominal and endovaginal pelvic ultrasound examinations were performed.  PATIENT STATED HISTORY: (As transcribed by Technologist)  Patient states she took a pregnancy test today and it was positive. States 5 days of  spotting/light bleeding with generalized pelvic discomfort.    FINDINGS:   No intrauterine gestational sac.  Endometrial stripe thickness is 11 mm.  No free fluid is seen.  No adnexal mass is identified.  There are bilateral complex ovarian cysts.  The right ovarian complex cyst measures 3.2 cm. The left complex ovarian cyst measures 2.4 cm.  Doppler flow is seen within both ovaries.  In the setting of a positive pregnancy test, this could be related to very early normal gestationan or failed pregnancy.  Ectopic pregnancy cannot be excluded.  Serial beta hCG measurement and clinical follow-up is recommended.            CONCLUSION:  See above.  A preliminary report was provided by the [facility] teleradiology service.    LOCATION:  [id number]   Dictated by (CST): Stromberg, LeRoy, MD on 9/24/2024 at 5:09 AM     Finalized by (CST): Stromberg, LeRoy, MD on 9/24/2024 at 5:11 AM               MDM      This is a 29-year-old woman here for evaluation of vaginal spotting in early pregnancy, differential includes possible miscarriage versus ectopic pregnancy.  Ultrasound here shows bilateral ovarian cyst, no obvious ectopic, beta-hCG level here is 2481 from 706 days ago.  I discussed the case with patient's OB Dr. Hercules, requested patient receive methotrexatedy given high suspicion for ectopic pregnancy and inappropriate rise in hCG levels, requests followup in clinic in 3 days.  Discussed methotrexate and expectations with the patient, as well as return precautions.  Patient and  at bedside in agreement with plan.  Rh+.                                 Medical Decision Making      Disposition and Plan     Clinical Impression:  1. Ectopic pregnancy, unspecified location, unspecified whether intrauterine pregnancy present (HCC)         Disposition:  Discharge  9/30/2024  6:59 pm    Follow-up:  Isidro Chavarria MD  [address] [facility] [address]  [phone]    Follow up  Follow-up with your OB for further  evaluation in 3 days return to ER symptoms worsen or change or any other new concerns.          Medications Prescribed:  Current Discharge Medication List

## 2024-09-30 NOTE — ED INITIAL ASSESSMENT (HPI)
Pt had an US at OB office and concern for ectopic pregnancy.  Pt sent from Dr. Chavarria's office.    Ob requesting labs and beta.  Pt reports mild cramping.  No bleeding.  No fever at home.

## 2024-10-03 ENCOUNTER — LAB ENCOUNTER (OUTPATIENT)
Dept: LAB | Age: 29
End: 2024-10-03
Attending: FAMILY MEDICINE
Payer: COMMERCIAL

## 2024-10-03 DIAGNOSIS — Z00.00 LABORATORY TESTS ORDERED AS PART OF A COMPLETE PHYSICAL EXAM (CPE): ICD-10-CM

## 2024-10-03 DIAGNOSIS — N97.9 FEMALE FERTILITY PROBLEM: ICD-10-CM

## 2024-10-03 LAB
ALBUMIN SERPL-MCNC: 4.6 G/DL (ref 3.2–4.8)
ALBUMIN/GLOB SERPL: 1.6 {RATIO} (ref 1–2)
ALP LIVER SERPL-CCNC: 67 U/L
ALT SERPL-CCNC: 18 U/L
ANION GAP SERPL CALC-SCNC: 2 MMOL/L (ref 0–18)
AST SERPL-CCNC: 18 U/L (ref ?–34)
B-HCG SERPL-ACNC: 3320.9 MIU/ML
BASOPHILS # BLD AUTO: 0.02 X10(3) UL (ref 0–0.2)
BASOPHILS NFR BLD AUTO: 0.2 %
BILIRUB SERPL-MCNC: 0.4 MG/DL (ref 0.3–1.2)
BUN BLD-MCNC: 6 MG/DL (ref 9–23)
CALCIUM BLD-MCNC: 9.8 MG/DL (ref 8.7–10.4)
CHLORIDE SERPL-SCNC: 107 MMOL/L (ref 98–112)
CHOLEST SERPL-MCNC: 131 MG/DL (ref ?–200)
CO2 SERPL-SCNC: 24 MMOL/L (ref 21–32)
CREAT BLD-MCNC: 0.7 MG/DL
EGFRCR SERPLBLD CKD-EPI 2021: 119 ML/MIN/1.73M2 (ref 60–?)
EOSINOPHIL # BLD AUTO: 0.04 X10(3) UL (ref 0–0.7)
EOSINOPHIL NFR BLD AUTO: 0.5 %
ERYTHROCYTE [DISTWIDTH] IN BLOOD BY AUTOMATED COUNT: 12.9 %
EST. AVERAGE GLUCOSE BLD GHB EST-MCNC: 120 MG/DL (ref 68–126)
FASTING PATIENT LIPID ANSWER: YES
FASTING STATUS PATIENT QL REPORTED: YES
FSH SERPL-ACNC: <0.3 MIU/ML
GLOBULIN PLAS-MCNC: 2.8 G/DL (ref 2–3.5)
GLUCOSE BLD-MCNC: 115 MG/DL (ref 70–99)
HBA1C MFR BLD: 5.8 % (ref ?–5.7)
HCT VFR BLD AUTO: 38.4 %
HDLC SERPL-MCNC: 42 MG/DL (ref 40–59)
HGB BLD-MCNC: 13.3 G/DL
IMM GRANULOCYTES # BLD AUTO: 0.05 X10(3) UL (ref 0–1)
IMM GRANULOCYTES NFR BLD: 0.6 %
LDLC SERPL CALC-MCNC: 67 MG/DL (ref ?–100)
LH SERPL-ACNC: <0.2 MIU/ML
LYMPHOCYTES # BLD AUTO: 1.82 X10(3) UL (ref 1–4)
LYMPHOCYTES NFR BLD AUTO: 21.9 %
MCH RBC QN AUTO: 29.1 PG (ref 26–34)
MCHC RBC AUTO-ENTMCNC: 34.6 G/DL (ref 31–37)
MCV RBC AUTO: 84 FL
MONOCYTES # BLD AUTO: 0.27 X10(3) UL (ref 0.1–1)
MONOCYTES NFR BLD AUTO: 3.2 %
NEUTROPHILS # BLD AUTO: 6.11 X10 (3) UL (ref 1.5–7.7)
NEUTROPHILS # BLD AUTO: 6.11 X10(3) UL (ref 1.5–7.7)
NEUTROPHILS NFR BLD AUTO: 73.6 %
NONHDLC SERPL-MCNC: 89 MG/DL (ref ?–130)
OSMOLALITY SERPL CALC.SUM OF ELEC: 275 MOSM/KG (ref 275–295)
PLATELET # BLD AUTO: 227 10(3)UL (ref 150–450)
POTASSIUM SERPL-SCNC: 3.6 MMOL/L (ref 3.5–5.1)
PROGEST SERPL-MCNC: 12.34 NG/ML
PROT SERPL-MCNC: 7.4 G/DL (ref 5.7–8.2)
RBC # BLD AUTO: 4.57 X10(6)UL
SODIUM SERPL-SCNC: 133 MMOL/L (ref 136–145)
TESTOST SERPL-MCNC: 32.5 NG/DL
TRIGL SERPL-MCNC: 123 MG/DL (ref 30–149)
TSI SER-ACNC: 2.02 MIU/ML (ref 0.55–4.78)
VIT D+METAB SERPL-MCNC: 65.6 NG/ML (ref 30–100)
VLDLC SERPL CALC-MCNC: 19 MG/DL (ref 0–30)
WBC # BLD AUTO: 8.3 X10(3) UL (ref 4–11)

## 2024-10-03 PROCEDURE — 82306 VITAMIN D 25 HYDROXY: CPT

## 2024-10-03 PROCEDURE — 36415 COLL VENOUS BLD VENIPUNCTURE: CPT

## 2024-10-03 PROCEDURE — 84443 ASSAY THYROID STIM HORMONE: CPT

## 2024-10-03 PROCEDURE — 82671 ASSAY OF ESTROGENS: CPT

## 2024-10-03 PROCEDURE — 84403 ASSAY OF TOTAL TESTOSTERONE: CPT

## 2024-10-03 PROCEDURE — 83002 ASSAY OF GONADOTROPIN (LH): CPT

## 2024-10-03 PROCEDURE — 80061 LIPID PANEL: CPT

## 2024-10-03 PROCEDURE — 80053 COMPREHEN METABOLIC PANEL: CPT

## 2024-10-03 PROCEDURE — 84144 ASSAY OF PROGESTERONE: CPT

## 2024-10-03 PROCEDURE — 83036 HEMOGLOBIN GLYCOSYLATED A1C: CPT

## 2024-10-03 PROCEDURE — 83001 ASSAY OF GONADOTROPIN (FSH): CPT

## 2024-10-03 PROCEDURE — 84702 CHORIONIC GONADOTROPIN TEST: CPT

## 2024-10-03 PROCEDURE — 85025 COMPLETE CBC W/AUTO DIFF WBC: CPT

## 2024-10-04 DIAGNOSIS — R73.09 ELEVATED GLUCOSE: Primary | ICD-10-CM

## 2024-10-04 DIAGNOSIS — E87.1 LOW SODIUM LEVELS: ICD-10-CM

## 2024-10-07 ENCOUNTER — TELEPHONE (OUTPATIENT)
Dept: FAMILY MEDICINE CLINIC | Facility: CLINIC | Age: 29
End: 2024-10-07

## 2024-10-07 DIAGNOSIS — F51.05 INSOMNIA DUE TO OTHER MENTAL DISORDER: ICD-10-CM

## 2024-10-07 DIAGNOSIS — F41.1 GAD (GENERALIZED ANXIETY DISORDER): ICD-10-CM

## 2024-10-07 DIAGNOSIS — F99 INSOMNIA DUE TO OTHER MENTAL DISORDER: ICD-10-CM

## 2024-10-09 ENCOUNTER — TELEPHONE (OUTPATIENT)
Dept: OBGYN UNIT | Facility: HOSPITAL | Age: 29
End: 2024-10-09

## 2024-10-09 LAB
ESTRADIOL: 436 PG/ML
ESTRONE: 428 PG/ML

## 2024-10-10 RX ORDER — PAROXETINE 30 MG/1
30 TABLET, FILM COATED ORAL EVERY MORNING
Qty: 30 TABLET | Refills: 0 | Status: SHIPPED | OUTPATIENT
Start: 2024-10-10 | End: 2024-10-21

## 2024-10-10 NOTE — TELEPHONE ENCOUNTER
Future Appointments   Date Time Provider Department Center   10/21/2024  1:00 PM Delbert English, KEVIN [facility] [address]     Refills pending.

## 2024-10-10 NOTE — TELEPHONE ENCOUNTER
Disp Refills Start End    CLONAZEPAM 0.5 MG Oral Tab 90 tablet 0 9/20/2024 --    Sig - Route: Take 1 tablet (0.5 mg total) by mouth 2 (two) times daily as needed for Anxiety. - Oral    Sent to pharmacy as: clonazePAM 0.5 MG Oral Tablet (KlonoPIN)    E-Prescribing Status: Receipt confirmed by pharmacy (9/20/2024 12:02 PM CDT)        Medication Quantity Refills Start End   PAROXETINE 30 MG Oral Tab 90 tablet 0 8/6/2024 11/4/2024   Sig:   Take 1 tablet (30 mg total) by mouth every morning. Needs appointment prior to further refills.     Route:   Oral     Order #:   [id number]       Psychiatric Non-Scheduled (Anti-Anxiety) Ttqylw15/10/2024 02:35 PM   Protocol Details In person appointment or virtual visit in the past 6 mos or appointment in next 3 mos    Depression Screening completed within the past 12 months     Medication Quantity Refills Start End   HYDROXYZINE 50 MG Oral Tab (Discontinued) 90 tablet 0 8/6/2024 9/30/2024   Sig:   Take 1 tablet (50 mg total) by mouth every 8 (eight) hours as needed for Anxiety.     Route:   Oral     PRN Reason(s):   Anxiety     Order #:   [id number]            Video 5/15/24  Future Appointments   Date Time Provider Department Center   10/21/2024  1:00 PM Delbert English APRN [facility] [address]      RX pended for clonazepam, and hydroxyzine

## 2024-10-10 NOTE — TELEPHONE ENCOUNTER
Last visit 5/15/24 Telemedicine    Patient is due for annual physical, please schedule appt and we can send refills and order labs. Thanks!

## 2024-10-11 NOTE — TELEPHONE ENCOUNTER
Future Appointments   Date Time Provider Department Center   10/21/2024  1:00 PM Delbert English, KEVIN [facility] [address]

## 2024-10-14 RX ORDER — CLONAZEPAM 0.5 MG/1
0.5 TABLET ORAL 2 TIMES DAILY PRN
Qty: 90 TABLET | Refills: 0 | OUTPATIENT
Start: 2024-10-14

## 2024-10-14 RX ORDER — HYDROXYZINE HYDROCHLORIDE 50 MG/1
50 TABLET, FILM COATED ORAL EVERY 8 HOURS PRN
Qty: 90 TABLET | Refills: 0 | OUTPATIENT
Start: 2024-10-14 | End: 2024-11-13

## 2024-10-21 ENCOUNTER — OFFICE VISIT (OUTPATIENT)
Dept: FAMILY MEDICINE CLINIC | Facility: CLINIC | Age: 29
End: 2024-10-21
Payer: COMMERCIAL

## 2024-10-21 VITALS
SYSTOLIC BLOOD PRESSURE: 110 MMHG | DIASTOLIC BLOOD PRESSURE: 70 MMHG | WEIGHT: 159.81 LBS | TEMPERATURE: 98 F | HEIGHT: 64 IN | HEART RATE: 99 BPM | RESPIRATION RATE: 18 BRPM | BODY MASS INDEX: 27.28 KG/M2

## 2024-10-21 DIAGNOSIS — Z00.00 WELLNESS EXAMINATION: Primary | ICD-10-CM

## 2024-10-21 DIAGNOSIS — F43.0 PANIC ATTACK AS REACTION TO STRESS: ICD-10-CM

## 2024-10-21 DIAGNOSIS — L70.0 ACNE VULGARIS: ICD-10-CM

## 2024-10-21 DIAGNOSIS — F41.0 PANIC ATTACK AS REACTION TO STRESS: ICD-10-CM

## 2024-10-21 DIAGNOSIS — F99 INSOMNIA DUE TO OTHER MENTAL DISORDER: ICD-10-CM

## 2024-10-21 DIAGNOSIS — Z00.00 LABORATORY EXAMINATION ORDERED AS PART OF A ROUTINE GENERAL MEDICAL EXAMINATION: ICD-10-CM

## 2024-10-21 DIAGNOSIS — F41.1 GAD (GENERALIZED ANXIETY DISORDER): ICD-10-CM

## 2024-10-21 DIAGNOSIS — F51.05 INSOMNIA DUE TO OTHER MENTAL DISORDER: ICD-10-CM

## 2024-10-21 PROCEDURE — 3078F DIAST BP <80 MM HG: CPT | Performed by: FAMILY MEDICINE

## 2024-10-21 PROCEDURE — 3008F BODY MASS INDEX DOCD: CPT | Performed by: FAMILY MEDICINE

## 2024-10-21 PROCEDURE — 99385 PREV VISIT NEW AGE 18-39: CPT | Performed by: FAMILY MEDICINE

## 2024-10-21 PROCEDURE — 99214 OFFICE O/P EST MOD 30 MIN: CPT | Performed by: FAMILY MEDICINE

## 2024-10-21 PROCEDURE — 3074F SYST BP LT 130 MM HG: CPT | Performed by: FAMILY MEDICINE

## 2024-10-21 RX ORDER — SPIRONOLACTONE 100 MG/1
100 TABLET, FILM COATED ORAL DAILY
COMMUNITY
Start: 2024-09-18 | End: 2024-10-21

## 2024-10-21 RX ORDER — HYDROXYZINE HYDROCHLORIDE 25 MG/1
25 TABLET, FILM COATED ORAL EVERY 8 HOURS PRN
Qty: 90 TABLET | Refills: 2 | Status: SHIPPED | OUTPATIENT
Start: 2024-10-21

## 2024-10-21 RX ORDER — SPIRONOLACTONE 100 MG/1
100 TABLET, FILM COATED ORAL DAILY
Qty: 30 TABLET | Refills: 0 | Status: SHIPPED | OUTPATIENT
Start: 2024-10-21

## 2024-10-21 RX ORDER — CHOLECALCIFEROL (VITAMIN D3) 125 MCG
CAPSULE ORAL
COMMUNITY

## 2024-10-21 RX ORDER — CLONAZEPAM 0.5 MG/1
0.5 TABLET ORAL 2 TIMES DAILY PRN
Qty: 60 TABLET | Refills: 0 | Status: SHIPPED | OUTPATIENT
Start: 2024-10-21

## 2024-10-21 RX ORDER — PAROXETINE 30 MG/1
30 TABLET, FILM COATED ORAL EVERY MORNING
Qty: 30 TABLET | Refills: 0 | Status: CANCELLED | OUTPATIENT
Start: 2024-10-21

## 2024-10-21 RX ORDER — CLONAZEPAM 0.5 MG/1
0.5 TABLET ORAL 2 TIMES DAILY PRN
Qty: 90 TABLET | Refills: 0 | Status: CANCELLED | OUTPATIENT
Start: 2024-10-21

## 2024-10-21 NOTE — PROGRESS NOTES
CHIEF COMPLAINT:     Chief Complaint   Patient presents with    Well Adult     Patient here for yearly exam and lab work. Patient will see OBGYN for pap smear.       HPI:   Lynda Sin is a 29 year old female who presents for a complete physical exam.     Patient has concerns of switching medications so that she can have a health pregnancy and not worry about changing medications while pregnant. .  [facility] Lab Encounter on 10/03/2024   Component Date Value Ref Range Status    WBC 10/03/2024 8.3  4.0 - 11.0 x10(3) uL Final    RBC 10/03/2024 4.57  3.80 - 5.30 x10(6)uL Final    HGB 10/03/2024 13.3  12.0 - 16.0 g/dL Final    HCT 10/03/2024 38.4  35.0 - 48.0 % Final    PLT 10/03/2024 227.0  150.0 - 450.0 10(3)uL Final    MCV 10/03/2024 84.0  80.0 - 100.0 fL Final    MCH 10/03/2024 29.1  26.0 - 34.0 pg Final    MCHC 10/03/2024 34.6  31.0 - 37.0 g/dL Final    RDW 10/03/2024 12.9  % Final    Neutrophil Absolute Prelim 10/03/2024 6.11  1.50 - 7.70 x10 (3) uL Final    Neutrophil Absolute 10/03/2024 6.11  1.50 - 7.70 x10(3) uL Final    Lymphocyte Absolute 10/03/2024 1.82  1.00 - 4.00 x10(3) uL Final    Monocyte Absolute 10/03/2024 0.27  0.10 - 1.00 x10(3) uL Final    Eosinophil Absolute 10/03/2024 0.04  0.00 - 0.70 x10(3) uL Final    Basophil Absolute 10/03/2024 0.02  0.00 - 0.20 x10(3) uL Final    Immature Granulocyte Absolute 10/03/2024 0.05  0.00 - 1.00 x10(3) uL Final    Neutrophil % 10/03/2024 73.6  % Final    Lymphocyte % 10/03/2024 21.9  % Final    Monocyte % 10/03/2024 3.2  % Final    Eosinophil % 10/03/2024 0.5  % Final    Basophil % 10/03/2024 0.2  % Final    Immature Granulocyte % 10/03/2024 0.6  % Final    Glucose 10/03/2024 115 (H)  70 - 99 mg/dL Final    Sodium 10/03/2024 133 (L)  136 - 145 mmol/L Final    Potassium 10/03/2024 3.6  3.5 - 5.1 mmol/L Final    Chloride 10/03/2024 107  98 - 112 mmol/L Final    CO2 10/03/2024 24.0  21.0 - 32.0 mmol/L Final    Anion Gap 10/03/2024 2  0 - 18 mmol/L Final    BUN  10/03/2024 6 (L)  9 - 23 mg/dL Final    Creatinine 10/03/2024 0.70  0.55 - 1.02 mg/dL Final    Calcium, Total 10/03/2024 9.8  8.7 - 10.4 mg/dL Final    Calculated Osmolality 10/03/2024 275  275 - 295 mOsm/kg Final    eGFR-Cr 10/03/2024 119  >=60 mL/min/1.73m2 Final    AST 10/03/2024 18  <34 U/L Final    ALT 10/03/2024 18  10 - 49 U/L Final    Alkaline Phosphatase 10/03/2024 67  37 - 98 U/L Final    Bilirubin, Total 10/03/2024 0.4  0.3 - 1.2 mg/dL Final    Total Protein 10/03/2024 7.4  5.7 - 8.2 g/dL Final    Albumin 10/03/2024 4.6  3.2 - 4.8 g/dL Final    Globulin  10/03/2024 2.8  2.0 - 3.5 g/dL Final    A/G Ratio 10/03/2024 1.6  1.0 - 2.0 Final    Patient Fasting for CMP? 10/03/2024 Yes   Final    Cholesterol, Total 10/03/2024 131  <200 mg/dL Final    HDL Cholesterol 10/03/2024 42  40 - 59 mg/dL Final    Triglycerides 10/03/2024 123  30 - 149 mg/dL Final    LDL Cholesterol 10/03/2024 67  <100 mg/dL Final    VLDL 10/03/2024 19  0 - 30 mg/dL Final    Non HDL Chol 10/03/2024 89  <130 mg/dL Final    Patient Fasting for Lipid? 10/03/2024 Yes   Final    TSH 10/03/2024 2.024  0.550 - 4.780 mIU/mL Final    HgbA1C 10/03/2024 5.8 (H)  <5.7 % Final    Estimated Average Glucose 10/03/2024 120  68 - 126 mg/dL Final    LH 10/03/2024 <0.2  No established range for female sex mIU/mL Final    Progesterone 10/03/2024 12.34  No established range for female sex ng/mL Final    Testosterone 10/03/2024 32.50  8.38-35.01 ng/dL ng/dL Final    FSH 10/03/2024 <0.3  No established range for female sex mIU/mL Final    Estrone 10/03/2024 428 (H)  27 - 231 pg/mL Final    Estradiol 10/03/2024 436.0  pg/mL Final    Vitamin D, 25OH, Total 10/03/2024 65.6  30.0 - 100.0 ng/mL Final    Hcg Quantitative 10/03/2024 3,320.9 (H)  <=4.2 mIU/mL Final   Admission on 09/30/2024, Discharged on 09/30/2024   Component Date Value Ref Range Status    Hold Lavender 09/30/2024 Auto Resulted   Final    Hold Lt Green 09/30/2024 Auto Resulted   Final    WBC 09/30/2024  10.7  4.0 - 11.0 x10(3) uL Final    RBC 09/30/2024 4.91  3.80 - 5.30 x10(6)uL Final    HGB 09/30/2024 14.4  12.0 - 16.0 g/dL Final    HCT 09/30/2024 39.6  35.0 - 48.0 % Final    PLT 09/30/2024 235.0  150.0 - 450.0 10(3)uL Final    MCV 09/30/2024 80.7  80.0 - 100.0 fL Final    MCH 09/30/2024 29.3  26.0 - 34.0 pg Final    MCHC 09/30/2024 36.4  31.0 - 37.0 g/dL Final    RDW 09/30/2024 12.8  % Final    Neutrophil Absolute Prelim 09/30/2024 8.48 (H)  1.50 - 7.70 x10 (3) uL Final    Neutrophil Absolute 09/30/2024 8.48 (H)  1.50 - 7.70 x10(3) uL Final    Lymphocyte Absolute 09/30/2024 1.41  1.00 - 4.00 x10(3) uL Final    Monocyte Absolute 09/30/2024 0.65  0.10 - 1.00 x10(3) uL Final    Eosinophil Absolute 09/30/2024 0.02  0.00 - 0.70 x10(3) uL Final    Basophil Absolute 09/30/2024 0.02  0.00 - 0.20 x10(3) uL Final    Immature Granulocyte Absolute 09/30/2024 0.07  0.00 - 1.00 x10(3) uL Final    Neutrophil % 09/30/2024 79.6  % Final    Lymphocyte % 09/30/2024 13.2  % Final    Monocyte % 09/30/2024 6.1  % Final    Eosinophil % 09/30/2024 0.2  % Final    Basophil % 09/30/2024 0.2  % Final    Immature Granulocyte % 09/30/2024 0.7  % Final    Glucose 09/30/2024 130 (H)  70 - 99 mg/dL Final    Sodium 09/30/2024 136  136 - 145 mmol/L Final    Potassium 09/30/2024 3.9  3.5 - 5.1 mmol/L Final    Chloride 09/30/2024 106  98 - 112 mmol/L Final    CO2 09/30/2024 25.0  21.0 - 32.0 mmol/L Final    Anion Gap 09/30/2024 5  0 - 18 mmol/L Final    BUN 09/30/2024 10  9 - 23 mg/dL Final    Creatinine 09/30/2024 0.74  0.55 - 1.02 mg/dL Final    Calcium, Total 09/30/2024 9.9  8.7 - 10.4 mg/dL Final    Calculated Osmolality 09/30/2024 283  275 - 295 mOsm/kg Final    eGFR-Cr 09/30/2024 111  >=60 mL/min/1.73m2 Final    AST 09/30/2024 18  <34 U/L Final    ALT 09/30/2024 18  10 - 49 U/L Final    Alkaline Phosphatase 09/30/2024 67  37 - 98 U/L Final    Bilirubin, Total 09/30/2024 0.3  0.3 - 1.2 mg/dL Final    Total Protein 09/30/2024 7.7  5.7 - 8.2  g/dL Final    Albumin 09/30/2024 5.0 (H)  3.2 - 4.8 g/dL Final    Globulin  09/30/2024 2.7  2.0 - 3.5 g/dL Final    A/G Ratio 09/30/2024 1.9  1.0 - 2.0 Final    Hcg Quantitative 09/30/2024 2,481.3 (H)  <=4.2 mIU/mL Final   Admission on 09/23/2024, Discharged on 09/24/2024   Component Date Value Ref Range Status    Glucose 09/24/2024 116 (H)  70 - 99 mg/dL Final    Sodium 09/24/2024 137  136 - 145 mmol/L Final    Potassium 09/24/2024 3.7  3.5 - 5.1 mmol/L Final    Chloride 09/24/2024 104  98 - 112 mmol/L Final    CO2 09/24/2024 27.0  21.0 - 32.0 mmol/L Final    Anion Gap 09/24/2024 6  0 - 18 mmol/L Final    BUN 09/24/2024 11  9 - 23 mg/dL Final    Creatinine 09/24/2024 1.02  0.55 - 1.02 mg/dL Final    Calcium, Total 09/24/2024 9.3  8.5 - 10.1 mg/dL Final    Calculated Osmolality 09/24/2024 284  275 - 295 mOsm/kg Final    eGFR-Cr 09/24/2024 76  >=60 mL/min/1.73m2 Final    AST 09/24/2024 14 (L)  15 - 37 U/L Final    ALT 09/24/2024 28  13 - 56 U/L Final    Alkaline Phosphatase 09/24/2024 62  37 - 98 U/L Final    Bilirubin, Total 09/24/2024 0.2  0.1 - 2.0 mg/dL Final    Total Protein 09/24/2024 7.3  6.4 - 8.2 g/dL Final    Albumin 09/24/2024 4.0  3.4 - 5.0 g/dL Final    Globulin  09/24/2024 3.3  2.8 - 4.4 g/dL Final    A/G Ratio 09/24/2024 1.2  1.0 - 2.0 Final    WBC 09/24/2024 9.8  4.0 - 11.0 x10(3) uL Final    RBC 09/24/2024 4.66  3.80 - 5.30 x10(6)uL Final    HGB 09/24/2024 13.7  12.0 - 16.0 g/dL Final    HCT 09/24/2024 38.6  35.0 - 48.0 % Final    PLT 09/24/2024 223.0  150.0 - 450.0 10(3)uL Final    MCV 09/24/2024 82.8  80.0 - 100.0 fL Final    MCH 09/24/2024 29.4  26.0 - 34.0 pg Final    MCHC 09/24/2024 35.5  31.0 - 37.0 g/dL Final    RDW 09/24/2024 12.8  % Final    Neutrophil Absolute Prelim 09/24/2024 6.15  1.50 - 7.70 x10 (3) uL Final    Neutrophil Absolute 09/24/2024 6.15  1.50 - 7.70 x10(3) uL Final    Lymphocyte Absolute 09/24/2024 2.87  1.00 - 4.00 x10(3) uL Final    Monocyte Absolute 09/24/2024 0.68  0.10 -  1.00 x10(3) uL Final    Eosinophil Absolute 09/24/2024 0.05  0.00 - 0.70 x10(3) uL Final    Basophil Absolute 09/24/2024 0.03  0.00 - 0.20 x10(3) uL Final    Immature Granulocyte Absolute 09/24/2024 0.03  0.00 - 1.00 x10(3) uL Final    Neutrophil % 09/24/2024 62.7  % Final    Lymphocyte % 09/24/2024 29.3  % Final    Monocyte % 09/24/2024 6.9  % Final    Eosinophil % 09/24/2024 0.5  % Final    Basophil % 09/24/2024 0.3  % Final    Immature Granulocyte % 09/24/2024 0.3  % Final    Hcg Quantitative 09/24/2024 731.0 (H)  <=3.0 mIU/mL Final    ABO BLOOD TYPE 09/24/2024 A   Final    RH BLOOD TYPE 09/24/2024 Positive   Final   [facility] Lab Encounter on 08/20/2024   Component Date Value Ref Range Status    Urine Color 08/20/2024 Yellow  Yellow Final    Clarity Urine 08/20/2024 Clear  Clear Final    Spec Gravity 08/20/2024 1.023  1.005 - 1.030 Final    Glucose Urine 08/20/2024 Normal  Normal mg/dL Final    Bilirubin Urine 08/20/2024 Negative  Negative Final    Ketones Urine 08/20/2024 Negative  Negative mg/dL Final    Blood Urine 08/20/2024 Negative  Negative Final    pH Urine 08/20/2024 7.0  5.0 - 8.0 Final    Protein Urine 08/20/2024 Negative  Negative mg/dL Final    Urobilinogen Urine 08/20/2024 Normal  Normal mg/dL Final    Nitrite Urine 08/20/2024 Negative  Negative Final    Leukocyte Esterase Urine 08/20/2024 Negative  Negative Final    Microscopic 08/20/2024 Microscopic not indicated   Final    Urine Culture 08/20/2024 No Growth 2 Days   Final        Immunization:  Immunization History   Administered Date(s) Administered    Covid-19 Vaccine Pfizer 30 mcg/0.3 ml 09/21/2021, 10/12/2021    DTP 11/29/1993, 02/28/1994, 04/27/1994, 06/30/1995, 06/01/1999    HEP B 09/25/1993, 11/29/1993, 11/18/1994    MMR 06/30/1995, 06/01/1999    Meningococcal-Menactra 08/08/2008    TDAP 08/08/2008    Tb Intradermal Test 06/01/1999        Cervical cancer screening- will do with OB/GYN    Breast cancer screening- Reviewed need for patient to do  monthly self breast examinations.     Colon cancer screening- N/A    Dental/Eye Check up-  Recommended pt see dentist once every 6 months for a cleaning and once every year for an eye exam.     Current Outpatient Medications   Medication Sig Dispense Refill    spironolactone 100 MG Oral Tab Take 1 tablet (100 mg total) by mouth daily. 30 tablet 0    L-Theanine 200 MG Oral Cap Take by mouth.      clonazePAM 0.5 MG Oral Tab Take 1 tablet (0.5 mg total) by mouth 2 (two) times daily as needed for Anxiety. 60 tablet 0    hydrOXYzine 25 MG Oral Tab Take 1 tablet (25 mg total) by mouth every 8 (eight) hours as needed for Itching. 90 tablet 2    FLUoxetine 20 MG Oral Cap Take 1 capsule (20 mg total) by mouth daily. 30 capsule 2    Prenatal Multivit-Min-Fe-FA (PRE- FORMULA OR) Take by mouth.      Docosahexaenoic Acid (DHA COMPLETE OR) Take by mouth.      cyanocobalamin 500 MCG Oral Tab Take 1 tablet (500 mcg total) by mouth daily.      vitamin A 3 MG (97514 UT) Oral Cap Take 1 capsule (10,000 Units total) by mouth daily.      Lactobacillus-Inulin (CULTURELLE ADULT ULT BALANCE OR) Take 1 tablet by mouth daily.      Biotin 86790 MCG Oral Tab Take 1 tablet by mouth daily.      Vitamin D3, Cholecalciferol, 125 MCG (5000 UT) Oral Cap Take 1 capsule (5,000 Units total) by mouth daily.      letrozole 2.5 MG Oral Tab Take 1 tablet (2.5 mg total) by mouth daily. (Patient not taking: Reported on 10/21/2024)      Folic Acid (FOLATE OR) Take 5,000 mcg by mouth daily. (Patient not taking: Reported on 10/21/2024)        Past Medical History:    Allergic rhinitis    Anxiety    Asthma (HCC)    KRYSTIN III (cervical intraepithelial neoplasia grade III) with severe dysplasia    Exercise-induced asthma (HCC)    H/O LEEP    HGSIL (high grade squamous intraepithelial lesion) on Pap smear of cervix    will see family friend who is a gyne    High grade squamous intraepithelial lesion (HGSIL), grade 3 KRYTSIN, on biopsy of cervix      Past Surgical  History:   Procedure Laterality Date    Hernia surgery  1996      Family History   Problem Relation Age of Onset    Anxiety Mother       Social History:  Social History     Socioeconomic History    Marital status:    Occupational History    Occupation: EMS   Tobacco Use    Smoking status: Never    Smokeless tobacco: Never   Vaping Use    Vaping status: Never Used   Substance and Sexual Activity    Alcohol use: No    Drug use: No    Sexual activity: Yes     Partners: Male   Other Topics Concern    Caffeine Concern No    Exercise No    Seat Belt No    Special Diet No    Stress Concern No    Weight Concern Yes     Comment: Weight loss      Exercise: minimal.  Diet: watches fats closely and watches sugar closely     REVIEW OF SYSTEMS:   GENERAL: Feels well otherwise  SKIN: denies any unusual skin lesions  EYES:denies blurred vision or double vision  HEENT: denies nasal congestion, sinus pain or ST  LUNGS: denies shortness of breath at rest on on exertion  CARDIOVASCULAR: denies chest pain or palpitations   GI: denies abdominal pain or heartburn.  No N/V/C/D.  : denies vaginal discharge, dysuria, suprapubic pain.   MUSCULOSKELETAL: denies back pain or other joint pain  NEURO: denies headaches  PSYCHE: denies depression or anxiety  HEMATOLOGIC: denies hx of anemia or other bleeding disorders  ENDOCRINE: denies thyroid history  ALLERGY/ASTHMA: denies hx of seasonal allergies or asthma    EXAM:   /70 (BP Location: Right arm, Patient Position: Sitting, Cuff Size: adult)   Pulse 99   Temp 97.5 °F (36.4 °C) (Temporal)   Resp 18   Ht 5' 4\" (1.626 m)   Wt 159 lb 12.8 oz (72.5 kg)   LMP 09/01/2024 (Approximate)   BMI 27.43 kg/m²   Body mass index is 27.43 kg/m².     GENERAL: well developed, well nourished,in no apparent distress  SKIN: no rashes,no suspicious lesions  HEENT: atraumatic, normocephalic,ears and throat are clear  EYES:PERRLA, EOMI, normal optic disk,conjunctiva are clear  NECK: supple,no  adenopathy,no bruits  CHEST: no chest tenderness  LUNGS: Clear to auscultation bilaterally. No diminished breath sounds. No wheezing, rhonchi, or rales.  CARDIO: RRR without murmur  GI: BS's present x4., No tenderness of palpation.  No obvious masses or palpable organomegaly   MUSCULOSKELETAL: back is not tender, ROM of the back fully intact  EXTREMITIES: no cyanosis, clubbing or edema  NEURO: Alert & Oriented x3. Cranial nerves are grossly intact.     ASSESSMENT AND PLAN:   Lynda Sin is a 29 year old female who presents for a annual  physical exam.     ASSESSMENT & PLAN:   1. Wellness examination    2. PAMELA (generalized anxiety disorder)  - clonazePAM 0.5 MG Oral Tab; Take 1 tablet (0.5 mg total) by mouth 2 (two) times daily as needed for Anxiety.  Dispense: 60 tablet; Refill: 0  - hydrOXYzine 25 MG Oral Tab; Take 1 tablet (25 mg total) by mouth every 8 (eight) hours as needed for Itching.  Dispense: 90 tablet; Refill: 2  - FLUoxetine 20 MG Oral Cap; Take 1 capsule (20 mg total) by mouth daily.  Dispense: 30 capsule; Refill: 2  - OP REFERRAL TO [facility]    3. Panic attack as reaction to stress  - FLUoxetine 20 MG Oral Cap; Take 1 capsule (20 mg total) by mouth daily.  Dispense: 30 capsule; Refill: 2    4. Insomnia due to other mental disorder  - hydrOXYzine 25 MG Oral Tab; Take 1 tablet (25 mg total) by mouth every 8 (eight) hours as needed for Itching.  Dispense: 90 tablet; Refill: 2  - FLUoxetine 20 MG Oral Cap; Take 1 capsule (20 mg total) by mouth daily.  Dispense: 30 capsule; Refill: 2  - OP REFERRAL TO [facility]    5. Acne vulgaris  - spironolactone 100 MG Oral Tab; Take 1 tablet (100 mg total) by mouth daily.  Dispense: 30 tablet; Refill: 0    6. Laboratory examination ordered as part of a routine general medical examination  - CBC With Differential With Platelet; Future  - Comp Metabolic Panel (14); Future  - Lipid Panel; Future  - TSH W Reflex To Free T4; Future  When trying to conceive stop  spironolactone.   Stop Paroxetine 30 mg daily  Start fluoxetine 20 mg daily.  Take hydroxyzine as needed for sleep and anxeity,   Stop clonazapam slowly switch to hydroxyzine exclusively before getting or trying for conception.   Close follow up with ob/gyne   Consult with Iliana Pierre from this office for counseling.   Discussion about need for additional therapy not just medication to manage condition.      Patient's questions/concerns were addressed and answered. Patient is in agreement with treatment plan.     Patient is to follow up as needed.

## 2024-10-30 ENCOUNTER — PATIENT MESSAGE (OUTPATIENT)
Dept: FAMILY MEDICINE CLINIC | Facility: CLINIC | Age: 29
End: 2024-10-30

## 2024-11-08 DIAGNOSIS — L70.0 ACNE VULGARIS: ICD-10-CM

## 2024-11-11 RX ORDER — SPIRONOLACTONE 100 MG/1
100 TABLET, FILM COATED ORAL DAILY
Qty: 30 TABLET | Refills: 0 | Status: SHIPPED | OUTPATIENT
Start: 2024-11-11

## 2024-11-11 NOTE — TELEPHONE ENCOUNTER
Name from pharmacy: Spironolactone 100 Mg Tab Nort          Will file in chart as: SPIRONOLACTONE 100 MG Oral Tab    Sig: Take 1 tablet (100 mg total) by mouth daily.    Disp: 30 tablet    Refills: 0    Start: 11/8/2024    Class: Normal    Non-formulary For: Acne vulgaris    Last ordered: 3 weeks ago (10/21/2024) by KEVIN Meraz    Last refill: 10/21/2024    Rx #: [id number]    Hypertension Medications Protocol [id number]/08/2024 04:37 PM   Protocol Details CMP or BMP in past 12 months    Last BP reading less than 140/90    In person appointment or virtual visit in the past 12 mos or appointment in next 3 mos    EGFRCR or GFRNAA > 50      LOV: 10/21/2024  RTC: none on file  Last Relevant Labs: 10/3/2024  Filled: 10/21/2024 #30 with 0 refills      Refill request approved per protocol.    No future appointments.

## 2024-11-18 ENCOUNTER — VIRTUAL PHONE E/M (OUTPATIENT)
Dept: FAMILY MEDICINE CLINIC | Facility: CLINIC | Age: 29
End: 2024-11-18
Payer: COMMERCIAL

## 2024-11-18 DIAGNOSIS — F41.1 GAD (GENERALIZED ANXIETY DISORDER): ICD-10-CM

## 2024-11-18 DIAGNOSIS — F99 INSOMNIA DUE TO OTHER MENTAL DISORDER: ICD-10-CM

## 2024-11-18 DIAGNOSIS — F51.05 INSOMNIA DUE TO OTHER MENTAL DISORDER: ICD-10-CM

## 2024-11-18 DIAGNOSIS — L70.0 ACNE VULGARIS: ICD-10-CM

## 2024-11-18 PROCEDURE — 99214 OFFICE O/P EST MOD 30 MIN: CPT | Performed by: FAMILY MEDICINE

## 2024-11-18 RX ORDER — CLONAZEPAM 0.5 MG/1
0.5 TABLET ORAL 2 TIMES DAILY PRN
Qty: 60 TABLET | Refills: 0 | Status: SHIPPED | OUTPATIENT
Start: 2024-11-18

## 2024-11-18 RX ORDER — HYDROXYZINE HYDROCHLORIDE 25 MG/1
25 TABLET, FILM COATED ORAL EVERY 8 HOURS PRN
Qty: 90 TABLET | Refills: 2 | Status: SHIPPED | OUTPATIENT
Start: 2024-11-18

## 2024-11-18 RX ORDER — PAROXETINE 30 MG/1
30 TABLET, FILM COATED ORAL EVERY MORNING
Qty: 90 TABLET | Refills: 1 | Status: SHIPPED | OUTPATIENT
Start: 2024-11-18

## 2024-11-18 RX ORDER — SPIRONOLACTONE 100 MG/1
100 TABLET, FILM COATED ORAL DAILY
Qty: 30 TABLET | Refills: 0 | Status: SHIPPED | OUTPATIENT
Start: 2024-11-18

## 2024-11-18 NOTE — PROGRESS NOTES
Virtual/Telephone Check-In    Lynda Sin verbally consents to a Virtual/Telephone Check-In service on 24.  Patient has been referred to the [facility] website at [email or web address] to review the yearly Consent to Treat document.  Patient understands and accepts financial responsibility for any deductible, co-insurance and/or co-pays associated with this service.       CHIEF COMPLAINT:   Change in medication.     HPI:   Lynda Sin is a 29 year old female who presents for a video visit.  Patient reports she did not tolerate the switch to fluoxetine from paroxetine. Patient states that she and her spouse are looking at other options like surrogacy instead of her being pregnant. Patient states that with the switch of medication she realized that she does not want to be pregnant because she was doing well until we attempted to switch medications. Then she realized that she would likely not do well off her previous medication which worked very well for her. Patient does like the addition of the hydroxyzine for sleep and would like to keep that.     Current Outpatient Medications   Medication Sig Dispense Refill    clonazePAM 0.5 MG Oral Tab Take 1 tablet (0.5 mg total) by mouth 2 (two) times daily as needed for Anxiety. 60 tablet 0    PARoxetine 30 MG Oral Tab Take 1 tablet (30 mg total) by mouth every morning. 90 tablet 1    hydrOXYzine 25 MG Oral Tab Take 1 tablet (25 mg total) by mouth every 8 (eight) hours as needed for Itching. 90 tablet 2    spironolactone 100 MG Oral Tab Take 1 tablet (100 mg total) by mouth daily. 30 tablet 0    L-Theanine 200 MG Oral Cap Take by mouth.      letrozole 2.5 MG Oral Tab Take 1 tablet (2.5 mg total) by mouth daily. (Patient not taking: Reported on 10/21/2024)      Prenatal Multivit-Min-Fe-FA (PRE-JESSE FORMULA OR) Take by mouth.      Docosahexaenoic Acid (DHA COMPLETE OR) Take by mouth.      cyanocobalamin 500 MCG Oral Tab Take 1 tablet (500 mcg total) by mouth daily.       vitamin A 3 MG (17695 UT) Oral Cap Take 1 capsule (10,000 Units total) by mouth daily.      Lactobacillus-Inulin (CULTURELLE ADULT ULT BALANCE OR) Take 1 tablet by mouth daily.      Folic Acid (FOLATE OR) Take 5,000 mcg by mouth daily. (Patient not taking: Reported on 10/21/2024)      Biotin 15389 MCG Oral Tab Take 1 tablet by mouth daily.      Vitamin D3, Cholecalciferol, 125 MCG (5000 UT) Oral Cap Take 1 capsule (5,000 Units total) by mouth daily.        Past Medical History:    Allergic rhinitis    Anxiety    Asthma (HCC)    KRYSTIN III (cervical intraepithelial neoplasia grade III) with severe dysplasia    Exercise-induced asthma (HCC)    H/O LEEP    HGSIL (high grade squamous intraepithelial lesion) on Pap smear of cervix    will see family friend who is a gyne    High grade squamous intraepithelial lesion (HGSIL), grade 3 KRYSTIN, on biopsy of cervix      Past Surgical History:   Procedure Laterality Date    Hernia surgery  1996         Social History     Socioeconomic History    Marital status:    Occupational History    Occupation: EMS   Tobacco Use    Smoking status: Never    Smokeless tobacco: Never   Vaping Use    Vaping status: Never Used   Substance and Sexual Activity    Alcohol use: No    Drug use: No    Sexual activity: Yes     Partners: Male   Other Topics Concern    Caffeine Concern No    Exercise No    Seat Belt No    Special Diet No    Stress Concern No    Weight Concern Yes     Comment: Weight loss         REVIEW OF SYSTEMS:   GENERAL: Normal appetite  SKIN: no rashes or abnormal skin lesions  HEENT: Normal  LUNGS: denies shortness of breath or wheezing,   CARDIOVASCULAR: denies chest pain or palpitations   GI: denies N/V/C or abdominal pain  NEURO: Denies headaches    EXAM:   General: Alert  Respiratory:   Speaking in full sentences comfortably  Normal work of breathing  No cough during visit  Head: Normocephalic  Nose: No obvious nasal discharge.  Skin: No obvious rashes or lesions from what  observed.     No results found for this or any previous visit (from the past 24 hours).    ASSESSMENT AND PLAN:   Lynda Sin is a 29 year old female who presents with symptoms that are consistent with    ASSESSMENT:   Encounter Diagnoses   Name Primary?    PAMELA (generalized anxiety disorder)     Insomnia due to other mental disorder     Acne vulgaris        PLAN: Meds as below.  See patient Instructions      No orders of the defined types were placed in this encounter.       Meds & Refills for this Visit:  Requested Prescriptions     Signed Prescriptions Disp Refills    clonazePAM 0.5 MG Oral Tab 60 tablet 0     Sig: Take 1 tablet (0.5 mg total) by mouth 2 (two) times daily as needed for Anxiety.    PARoxetine 30 MG Oral Tab 90 tablet 1     Sig: Take 1 tablet (30 mg total) by mouth every morning.    hydrOXYzine 25 MG Oral Tab 90 tablet 2     Sig: Take 1 tablet (25 mg total) by mouth every 8 (eight) hours as needed for Itching.    spironolactone 100 MG Oral Tab 30 tablet 0     Sig: Take 1 tablet (100 mg total) by mouth daily.       Risks, benefits, and side effects of medication explained and discussed. Patient encouraged to discuss options to prevent pregnancy with spouse. If patient does become pregnant she is to notify healthcare providers immediately to change medications for safety of fetus.     The patient indicates understanding of these issues and agrees to the plan.      Face to face time spent on Video Visit: 10  Total Time spent on visit including reviewing history, ordering labs/medication, patient examination and education: 10    Lynda Sin understands video visit evaluation is not a substitute for face-to-face examination or emergency care. Patient advised to go to ER or call 911 for worsening symptoms or acute distress.

## 2024-12-29 ENCOUNTER — E-VISIT (OUTPATIENT)
Dept: TELEHEALTH | Age: 29
End: 2024-12-29
Payer: COMMERCIAL

## 2024-12-29 DIAGNOSIS — B34.9 VIRAL SYNDROME: Primary | ICD-10-CM

## 2024-12-29 RX ORDER — BENZONATATE 200 MG/1
200 CAPSULE ORAL 3 TIMES DAILY PRN
Qty: 20 CAPSULE | Refills: 0 | Status: SHIPPED | OUTPATIENT
Start: 2024-12-29

## 2024-12-29 NOTE — PATIENT INSTRUCTIONS
-May call [phone] with questions or concerns.  -The ER is advised with worsening symptoms.  -Please refer to [employer] messages for further care instructions.

## 2024-12-29 NOTE — PROGRESS NOTES
HPI:  Lynda Sin is a 29 year old female who presents for an evisit.  See [employer] communications above.    Current Outpatient Medications   Medication Sig Dispense Refill    benzonatate 200 MG Oral Cap Take 1 capsule (200 mg total) by mouth 3 (three) times daily as needed for cough. 20 capsule 0    clonazePAM 0.5 MG Oral Tab Take 1 tablet (0.5 mg total) by mouth 2 (two) times daily as needed for Anxiety. 60 tablet 0    PARoxetine 30 MG Oral Tab Take 1 tablet (30 mg total) by mouth every morning. 90 tablet 1    hydrOXYzine 25 MG Oral Tab Take 1 tablet (25 mg total) by mouth every 8 (eight) hours as needed for Itching. 90 tablet 2    spironolactone 100 MG Oral Tab Take 1 tablet (100 mg total) by mouth daily. 30 tablet 0    L-Theanine 200 MG Oral Cap Take by mouth.      letrozole 2.5 MG Oral Tab Take 1 tablet (2.5 mg total) by mouth daily. (Patient not taking: Reported on 10/21/2024)      Prenatal Multivit-Min-Fe-FA (PRE-JESSE FORMULA OR) Take by mouth.      Docosahexaenoic Acid (DHA COMPLETE OR) Take by mouth.      cyanocobalamin 500 MCG Oral Tab Take 1 tablet (500 mcg total) by mouth daily.      vitamin A 3 MG (41801 UT) Oral Cap Take 1 capsule (10,000 Units total) by mouth daily.      Lactobacillus-Inulin (CULTURELLE ADULT ULT BALANCE OR) Take 1 tablet by mouth daily.      Folic Acid (FOLATE OR) Take 5,000 mcg by mouth daily. (Patient not taking: Reported on 10/21/2024)      Biotin 81337 MCG Oral Tab Take 1 tablet by mouth daily.      Vitamin D3, Cholecalciferol, 125 MCG (5000 UT) Oral Cap Take 1 capsule (5,000 Units total) by mouth daily.       Past Medical History:    Allergic rhinitis    Anxiety    Asthma (HCC)    KRYSTIN III (cervical intraepithelial neoplasia grade III) with severe dysplasia    Exercise-induced asthma (HCC)    H/O LEEP    HGSIL (high grade squamous intraepithelial lesion) on Pap smear of cervix    will see family friend who is a gyne    High grade squamous intraepithelial lesion (HGSIL),  grade 3 KRYSTIN, on biopsy of cervix     Past Surgical History:   Procedure Laterality Date    Hernia surgery  1996       Social History     Socioeconomic History    Marital status:    Occupational History    Occupation: EMS   Tobacco Use    Smoking status: Never    Smokeless tobacco: Never   Vaping Use    Vaping status: Never Used   Substance and Sexual Activity    Alcohol use: No    Drug use: No    Sexual activity: Yes     Partners: Male   Other Topics Concern    Caffeine Concern No    Exercise No    Seat Belt No    Special Diet No    Stress Concern No    Weight Concern Yes     Comment: Weight loss          No results found for this or any previous visit (from the past 24 hours).    ASSESSMENT AND PLAN:      ASSESSMENT:   Encounter Diagnosis   Name Primary?    Viral syndrome Yes       PLAN: Meds as below.  See patient Instructions  -Total of 13 minutes spent with patient.    Meds & Refills for this Visit:  Requested Prescriptions     Signed Prescriptions Disp Refills    benzonatate 200 MG Oral Cap 20 capsule 0     Sig: Take 1 capsule (200 mg total) by mouth 3 (three) times daily as needed for cough.       Risks, benefits, and side effects of medication explained and discussed.    Patient Instructions   -May call [phone] with questions or concerns.  -The ER is advised with worsening symptoms.  -Please refer to [facility] messages for further care instructions.       The patient indicates understanding of these issues and agrees to the plan.  See attached patient references.  The patient is asked to return if sx's persist or worsen.    Lynda Sin understands evisit evaluation is not a substitute for face-to-face examination or emergency care. Patient advised to go to ER or call 911 for worsening symptoms or acute distress.

## 2025-01-04 DIAGNOSIS — L70.0 ACNE VULGARIS: ICD-10-CM

## 2025-01-07 RX ORDER — SPIRONOLACTONE 100 MG/1
100 TABLET, FILM COATED ORAL DAILY
Qty: 30 TABLET | Refills: 0 | Status: SHIPPED | OUTPATIENT
Start: 2025-01-07

## 2025-01-13 DIAGNOSIS — F41.1 GAD (GENERALIZED ANXIETY DISORDER): ICD-10-CM

## 2025-01-13 NOTE — TELEPHONE ENCOUNTER
Requesting Clonazepam 0.5mg  Last OV: 11/18/24 Telemedicine  RTC: not noted  Last Rx'd 11/18/24 #60 with 0 refills    No future appointments.    [place] , last [place] 12/12/24 #30    Controlled med:  Rx pended and routed for approval/denial

## 2025-01-14 RX ORDER — CLONAZEPAM 0.5 MG/1
0.5 TABLET ORAL 2 TIMES DAILY PRN
Qty: 60 TABLET | Refills: 0 | Status: SHIPPED | OUTPATIENT
Start: 2025-01-14

## 2025-02-15 DIAGNOSIS — F41.1 GAD (GENERALIZED ANXIETY DISORDER): ICD-10-CM

## 2025-02-15 DIAGNOSIS — L70.0 ACNE VULGARIS: ICD-10-CM

## 2025-02-15 NOTE — TELEPHONE ENCOUNTER
Fax received from [facility] requesting r/fs on Clonazepam 0.5 mg tabs and Spironolactone 100 mg tabs.    No future appointments.    LOV: cpx: 10/21/24  Last r/f: 1/14/25/ 1/7/25 # 60/30 0 r/fs  Labs: 10/3/24     RTC: yearly    Please advise

## 2025-02-17 RX ORDER — CLONAZEPAM 0.5 MG/1
0.5 TABLET ORAL 2 TIMES DAILY PRN
Qty: 60 TABLET | Refills: 0 | Status: SHIPPED | OUTPATIENT
Start: 2025-02-17

## 2025-02-17 RX ORDER — SPIRONOLACTONE 100 MG/1
100 TABLET, FILM COATED ORAL DAILY
Qty: 30 TABLET | Refills: 0 | Status: SHIPPED | OUTPATIENT
Start: 2025-02-17

## 2025-03-02 ENCOUNTER — E-VISIT (OUTPATIENT)
Dept: TELEHEALTH | Age: 30
End: 2025-03-02
Payer: COMMERCIAL

## 2025-03-02 DIAGNOSIS — J32.9 VIRAL SINUSITIS: Primary | ICD-10-CM

## 2025-03-02 DIAGNOSIS — B97.89 VIRAL SINUSITIS: Primary | ICD-10-CM

## 2025-03-02 PROCEDURE — 99421 OL DIG E/M SVC 5-10 MIN: CPT | Performed by: PHYSICIAN ASSISTANT

## 2025-03-02 NOTE — PROGRESS NOTES
Lynda Sin is a 29 year old female.  HPI:   See answers to questions above.     Current Outpatient Medications   Medication Sig Dispense Refill    clonazePAM 0.5 MG Oral Tab Take 1 tablet (0.5 mg total) by mouth 2 (two) times daily as needed for Anxiety. 60 tablet 0    spironolactone 100 MG Oral Tab Take 1 tablet (100 mg total) by mouth daily. 30 tablet 0    benzonatate 200 MG Oral Cap Take 1 capsule (200 mg total) by mouth 3 (three) times daily as needed for cough. 20 capsule 0    PARoxetine 30 MG Oral Tab Take 1 tablet (30 mg total) by mouth every morning. 90 tablet 1    hydrOXYzine 25 MG Oral Tab Take 1 tablet (25 mg total) by mouth every 8 (eight) hours as needed for Itching. 90 tablet 2    L-Theanine 200 MG Oral Cap Take by mouth.      letrozole 2.5 MG Oral Tab Take 1 tablet (2.5 mg total) by mouth daily. (Patient not taking: Reported on 10/21/2024)      Prenatal Multivit-Min-Fe-FA (PRE- FORMULA OR) Take by mouth.      Docosahexaenoic Acid (DHA COMPLETE OR) Take by mouth.      cyanocobalamin 500 MCG Oral Tab Take 1 tablet (500 mcg total) by mouth daily.      vitamin A 3 MG (18781 UT) Oral Cap Take 1 capsule (10,000 Units total) by mouth daily.      Lactobacillus-Inulin (CULTURELLE ADULT ULT BALANCE OR) Take 1 tablet by mouth daily.      Folic Acid (FOLATE OR) Take 5,000 mcg by mouth daily. (Patient not taking: Reported on 10/21/2024)      Biotin 92620 MCG Oral Tab Take 1 tablet by mouth daily.      Vitamin D3, Cholecalciferol, 125 MCG (5000 UT) Oral Cap Take 1 capsule (5,000 Units total) by mouth daily.        Past Medical History:    Allergic rhinitis    Anxiety    Asthma (HCC)    KRYSTIN III (cervical intraepithelial neoplasia grade III) with severe dysplasia    Exercise-induced asthma (HCC)    H/O LEEP    HGSIL (high grade squamous intraepithelial lesion) on Pap smear of cervix    will see family friend who is a gyne    High grade squamous intraepithelial lesion (HGSIL), grade 3 KRYSTIN, on biopsy of cervix       Past Surgical History:   Procedure Laterality Date    Hernia surgery  1996      Family History   Problem Relation Age of Onset    Anxiety Mother       Social History:  Social History     Socioeconomic History    Marital status:    Occupational History    Occupation: EMS   Tobacco Use    Smoking status: Never    Smokeless tobacco: Never   Vaping Use    Vaping status: Never Used   Substance and Sexual Activity    Alcohol use: No    Drug use: No    Sexual activity: Yes     Partners: Male   Other Topics Concern    Caffeine Concern No    Exercise No    Seat Belt No    Special Diet No    Stress Concern No    Weight Concern Yes     Comment: Weight loss         ASSESSMENT AND PLAN:     Encounter Diagnosis   Name Primary?    Viral sinusitis Yes           Meds & Refills for this Visit:  Requested Prescriptions      No prescriptions requested or ordered in this encounter       Duration of  the service:  6 minutes    Patient advised to follow up with PCP if no improvement or worsening of symptoms  Refer to Channel M message for specific patient instructions

## 2025-03-20 DIAGNOSIS — L70.0 ACNE VULGARIS: ICD-10-CM

## 2025-03-21 RX ORDER — SPIRONOLACTONE 100 MG/1
100 TABLET, FILM COATED ORAL DAILY
Qty: 30 TABLET | Refills: 0 | Status: SHIPPED | OUTPATIENT
Start: 2025-03-21

## 2025-03-21 NOTE — TELEPHONE ENCOUNTER
Requesting Spironolactone 100mg  LOV: 11/18/24 telemedicine  RTC: not noted  Last Relevant Labs: 10/3/24  Filled: 2/17/25 #30 with 0 refills    No future appointments.    Hypertension Medications Protocol [id number]/21/2025 01:19 PM   Protocol Details   CMP or BMP in past 12 months    Last BP reading less than 140/90    In person appointment or virtual visit in the past 12 mos or appointment in next 3 mos    EGFRCR or GFRNAA > 50    Medication is active on med list     Rx sent to pharmacy per protocol

## 2025-03-27 DIAGNOSIS — F41.1 GAD (GENERALIZED ANXIETY DISORDER): ICD-10-CM

## 2025-03-28 NOTE — TELEPHONE ENCOUNTER
Requesting Clonazepam 0.5mg  Last OV: 11/18/24 Telemedicine  RTC: not noted  Last Rx'd 2/17/25 #60 with 0 refills    No future appointments.    [place] , [place] 2/18/25 #60    Controlled med:  Rx pended and routed for approval/denial

## 2025-03-30 RX ORDER — PAROXETINE 30 MG/1
30 TABLET, FILM COATED ORAL EVERY MORNING
Qty: 90 TABLET | Refills: 1 | Status: SHIPPED | OUTPATIENT
Start: 2025-03-30

## 2025-03-30 RX ORDER — CLONAZEPAM 0.5 MG/1
0.5 TABLET ORAL 2 TIMES DAILY PRN
Qty: 60 TABLET | Refills: 0 | Status: SHIPPED | OUTPATIENT
Start: 2025-03-30

## 2025-04-22 ENCOUNTER — E-VISIT (OUTPATIENT)
Dept: TELEHEALTH | Age: 30
End: 2025-04-22
Payer: COMMERCIAL

## 2025-04-22 DIAGNOSIS — R39.9 UTI SYMPTOMS: Primary | ICD-10-CM

## 2025-04-22 NOTE — PROGRESS NOTES
Lynda Sin is a 29 year old female who initiated e-visit care today.    HPI:   See answers to questionnaire submission     Current Medications[1]   Past Medical History[2]   Past Surgical History[3]   Family History[4]   Social History:  Short Social Hx on File[5]      ASSESSMENT AND PLAN:       Diagnoses and all orders for this visit:    UTI symptoms  -     Urine Culture, Routine; Future  -     Urinalysis, Routine; Future       Patient treated for UTI 2024 and 2024-- urine culture was negative both times. Hx of frequent UTI symptoms, but last positive urine culture in [facility] showed Group B strep 2023, prior to this last positive urine culture was .     Patient reports unable to be seen in person.  Discussed labs were ordered and there are labs with extended hours which may be more convenient for patient.   Lab order for UA/urine culture placed. Patient informed she has 48 hours to have labs done, she can always elect to be seen at [facility]/ as well.    Duration of  the service 25:  10 minutes      See [employer] message exchange and Patient Instructions for Comfort Care and patient education.          [1]   Current Outpatient Medications   Medication Sig Dispense Refill    CLONAZEPAM 0.5 MG Oral Tab Take 1 tablet (0.5 mg total) by mouth 2 (two) times daily as needed for Anxiety. 60 tablet 0    PARoxetine 30 MG Oral Tab Take 1 tablet (30 mg total) by mouth every morning. 90 tablet 1    SPIRONOLACTONE 100 MG Oral Tab Take 1 tablet (100 mg total) by mouth daily. 30 tablet 0    hydrOXYzine 25 MG Oral Tab Take 1 tablet (25 mg total) by mouth every 8 (eight) hours as needed for Itching. 90 tablet 2    L-Theanine 200 MG Oral Cap Take by mouth.      Prenatal Multivit-Min-Fe-FA (PRE-JESSE FORMULA OR) Take by mouth.      Docosahexaenoic Acid (DHA COMPLETE OR) Take by mouth.      cyanocobalamin 500 MCG Oral Tab Take 1 tablet (500 mcg total) by mouth daily.      vitamin A 3 MG (65901 UT) Oral Cap Take 1 capsule  (10,000 Units total) by mouth daily.      Lactobacillus-Inulin (CULTURELLE ADULT ULT BALANCE OR) Take 1 tablet by mouth daily.      Biotin 31721 MCG Oral Tab Take 1 tablet by mouth daily.      Vitamin D3, Cholecalciferol, 125 MCG (5000 UT) Oral Cap Take 1 capsule (5,000 Units total) by mouth daily.     [2]   Past Medical History:   Allergic rhinitis    Anxiety    Asthma (HCC)    KRYSTIN III (cervical intraepithelial neoplasia grade III) with severe dysplasia    Exercise-induced asthma (HCC)    H/O LEEP    HGSIL (high grade squamous intraepithelial lesion) on Pap smear of cervix    will see family friend who is a gyne    High grade squamous intraepithelial lesion (HGSIL), grade 3 KRYSTIN, on biopsy of cervix   [3]   Past Surgical History:  Procedure Laterality Date    Hernia surgery  1996   [4]   Family History  Problem Relation Age of Onset    Anxiety Mother    [5]   Social History  Socioeconomic History    Marital status:    Occupational History    Occupation: EMS   Tobacco Use    Smoking status: Never    Smokeless tobacco: Never   Vaping Use    Vaping status: Never Used   Substance and Sexual Activity    Alcohol use: No    Drug use: No    Sexual activity: Yes     Partners: Male   Other Topics Concern    Caffeine Concern No    Exercise No    Seat Belt No    Special Diet No    Stress Concern No    Weight Concern Yes     Comment: Weight loss

## 2025-04-29 PROCEDURE — 99421 OL DIG E/M SVC 5-10 MIN: CPT | Performed by: PHYSICIAN ASSISTANT

## 2025-04-30 ENCOUNTER — PATIENT MESSAGE (OUTPATIENT)
Dept: FAMILY MEDICINE CLINIC | Facility: CLINIC | Age: 30
End: 2025-04-30

## 2025-04-30 ENCOUNTER — TELEPHONE (OUTPATIENT)
Dept: FAMILY MEDICINE CLINIC | Facility: CLINIC | Age: 30
End: 2025-04-30

## 2025-04-30 ENCOUNTER — OFFICE VISIT (OUTPATIENT)
Dept: FAMILY MEDICINE CLINIC | Facility: CLINIC | Age: 30
End: 2025-04-30
Payer: COMMERCIAL

## 2025-04-30 VITALS
RESPIRATION RATE: 16 BRPM | OXYGEN SATURATION: 98 % | SYSTOLIC BLOOD PRESSURE: 100 MMHG | HEART RATE: 91 BPM | TEMPERATURE: 97 F | DIASTOLIC BLOOD PRESSURE: 70 MMHG | HEIGHT: 64 IN | WEIGHT: 159 LBS | BODY MASS INDEX: 27.14 KG/M2

## 2025-04-30 DIAGNOSIS — E66.3 OVERWEIGHT (BMI 25.0-29.9): Primary | ICD-10-CM

## 2025-04-30 DIAGNOSIS — R73.03 PREDIABETES: ICD-10-CM

## 2025-04-30 PROCEDURE — 3078F DIAST BP <80 MM HG: CPT | Performed by: FAMILY MEDICINE

## 2025-04-30 PROCEDURE — 99213 OFFICE O/P EST LOW 20 MIN: CPT | Performed by: FAMILY MEDICINE

## 2025-04-30 PROCEDURE — 3074F SYST BP LT 130 MM HG: CPT | Performed by: FAMILY MEDICINE

## 2025-04-30 PROCEDURE — 3008F BODY MASS INDEX DOCD: CPT | Performed by: FAMILY MEDICINE

## 2025-04-30 RX ORDER — TIRZEPATIDE 2.5 MG/.5ML
2.5 INJECTION, SOLUTION SUBCUTANEOUS WEEKLY
Qty: 2 ML | Refills: 0 | Status: SHIPPED | OUTPATIENT
Start: 2025-04-30 | End: 2025-05-02

## 2025-04-30 RX ORDER — HYDROXYZINE HYDROCHLORIDE 50 MG/1
50 TABLET, FILM COATED ORAL NIGHTLY PRN
Qty: 30 TABLET | Refills: 2 | Status: SHIPPED | OUTPATIENT
Start: 2025-04-30

## 2025-04-30 NOTE — TELEPHONE ENCOUNTER
Patient called after she left the office today and she forgot to ask for HYDROXYZINE 50 MG Oral Tab.  She will need that sent to [id number] - [place], [place] - [phone], [phone].      She said she originally was taking 25mg and then it was changed to 50mg and then back to 25mg. She wants to continue with the 50mg if possible.

## 2025-04-30 NOTE — TELEPHONE ENCOUNTER
Patient was seen today, 4/30/2025.   Forgot to mention needing a refill on Hydroxyzine.  Currently taking 25mg.   Was taking 50mg in June of 2024 but changed to 25mg in October 2024.  Requesting Hydroxyzine 50mg.    Per today's visit, no notes on Hydroxyzine.  Last discussion was noted on 11/18/2024 for virtual phone with Delbert.     Please advise.

## 2025-04-30 NOTE — PROGRESS NOTES
Note to patient: The 21st Century Cures Act makes medical notes like these available to patients in the interest of transparency. However, be advised this is a medical document. It is intended as peer to peer communication. It is written in medical language and may contain abbreviations or verbiage that are unfamiliar. It may appear blunt or direct. Medical documents are intended to carry relevant information, facts as evident, and the clinical opinion of the practitioner.         Chief Complaint   Patient presents with    Weight Problem     Patient would like to lose weight Patient states she's been trying to eat healthier, Patient does workout for her whole life     Discuss mediations hydroxyzine should be 50mg per patietn        HPI:     Weight: Patient is here for weight loss counseling and evaluation.   Patient would like to lose weight    She used to be on phentermine.   Labs ordered   Patient was considering Hers for GLP drug.   She looked through her insurance and found out zepbound is covered.   Last A1c value was 5.8% done 10/3/2024.  No personal or fmhx of pancreatitis, MEN, gastroparesis.   She has been on paxil since 2017.   She used to play volleyball in college.   She has been dieting and exercising for the past 3-4 years.   She started wroking out consistently. She is following a low carb diet.   Weight keeps trending up the last 2-3 years.    Family hx of obesity or thyroid disease: no  Ideal weight: 125 lbs. She used to be 125 lbs most of her adult years.   She has had one ectopic pregnancy.   Thyroid is normal   Patient's last menstrual period was 04/01/2025 (approximate).  She is not currently pregnancy planning.           Wt Readings from Last 6 Encounters:   04/30/25 159 lb (72.1 kg)   10/21/24 159 lb 12.8 oz (72.5 kg)   09/30/24 155 lb 3.3 oz (70.4 kg)   09/23/24 160 lb (72.6 kg)   03/29/24 145 lb (65.8 kg)   01/10/24 154 lb (69.9 kg)              Review of Systems   Constitutional: Negative  for fever, chills and fatigue. No distress.  Respiratory: Negative for cough, chest tightness, shortness of breath and wheezing.    Cardiovascular: Negative for chest pain, palpitations and leg swelling.   Gastrointestinal: Negative for nausea, vomiting, abdominal pain, diarrhea     Patient Active Problem List   Diagnosis    PAMELA (generalized anxiety disorder)    Panic attack as reaction to stress    HGSIL (high grade squamous intraepithelial lesion) on Pap smear of cervix    Exercise-induced asthma (HCC)    High grade squamous intraepithelial lesion (HGSIL), grade 3 KRYSTIN, on biopsy of cervix    Insomnia       Past Medical History:    Allergic rhinitis    Anxiety    Asthma (HCC)    KRYSTIN III (cervical intraepithelial neoplasia grade III) with severe dysplasia    Exercise-induced asthma (HCC)    H/O LEEP    HGSIL (high grade squamous intraepithelial lesion) on Pap smear of cervix    will see family friend who is a gyne    High grade squamous intraepithelial lesion (HGSIL), grade 3 KRYSTIN, on biopsy of cervix     Past Surgical History:   Procedure Laterality Date    Hernia surgery  1996     Family History   Problem Relation Age of Onset    Anxiety Mother      Social History     Socioeconomic History    Marital status:    Occupational History    Occupation: EMS   Tobacco Use    Smoking status: Never    Smokeless tobacco: Never   Vaping Use    Vaping status: Never Used   Substance and Sexual Activity    Alcohol use: No    Drug use: No    Sexual activity: Yes     Partners: Male   Other Topics Concern    Caffeine Concern No    Exercise No    Seat Belt No    Special Diet No    Stress Concern No    Weight Concern Yes     Comment: Weight loss       Current Outpatient Medications   Medication Sig Dispense Refill    Tirzepatide-Weight Management (ZEPBOUND) 2.5 MG/0.5ML Subcutaneous Solution Auto-injector Inject 2.5 mg into the skin once a week for 4 doses. 2 mL 0    CLONAZEPAM 0.5 MG Oral Tab Take 1 tablet (0.5 mg total) by  mouth 2 (two) times daily as needed for Anxiety. 60 tablet 0    PARoxetine 30 MG Oral Tab Take 1 tablet (30 mg total) by mouth every morning. 90 tablet 1    SPIRONOLACTONE 100 MG Oral Tab Take 1 tablet (100 mg total) by mouth daily. 30 tablet 0    hydrOXYzine 25 MG Oral Tab Take 1 tablet (25 mg total) by mouth every 8 (eight) hours as needed for Itching. 90 tablet 2    L-Theanine 200 MG Oral Cap Take by mouth.      Prenatal Multivit-Min-Fe-FA (PRE-JESSE FORMULA OR) Take by mouth.      Docosahexaenoic Acid (DHA COMPLETE OR) Take by mouth.      cyanocobalamin 500 MCG Oral Tab Take 1 tablet (500 mcg total) by mouth daily.      vitamin A 3 MG (64770 UT) Oral Cap Take 1 capsule (10,000 Units total) by mouth daily.      Lactobacillus-Inulin (CULTURELLE ADULT ULT BALANCE OR) Take 1 tablet by mouth daily.      Biotin 83233 MCG Oral Tab Take 1 tablet by mouth daily.      Vitamin D3, Cholecalciferol, 125 MCG (5000 UT) Oral Cap Take 1 capsule (5,000 Units total) by mouth daily.         Allergies  Allergies   Allergen Reactions    Influenza Vaccines HIVES and RASH    Amoxicillin NAUSEA AND VOMITING    Penicillins NAUSEA AND VOMITING       Health Maintenance  Immunizations:  Immunization History   Administered Date(s) Administered    Covid-19 Vaccine Pfizer 30 mcg/0.3 ml 2021, 10/12/2021    DTP 1993, 1994, 1994, 1995, 1999    HEP B 1993, 1993, 1994    MMR 1995, 1999    Meningococcal-Menactra 2008    TDAP 2008    Tb Intradermal Test 1999   Deferred Date(s) Deferred    FLUZONE 6 months and older PFS 0.5 ml (47539) 10/05/2023    Influenza Vaccine Refused 2022, 2025    Pneumococcal Conjugate PCV20 10/05/2023    TDAP 10/05/2023         Physical Exam  /70   Pulse 91   Temp 97.4 °F (36.3 °C) (Temporal)   Resp 16   Ht 5' 4\" (1.626 m)   Wt 159 lb (72.1 kg)   LMP 2025 (Approximate)   SpO2 98%   Breastfeeding No   BMI 27.29  kg/m²   Constitutional: Oriented to person, place, and time. No distress.   HEENT:  Normocephalic and atraumatic. No mass and no thyromegaly present.   Cardiovascular: Normal rate, regular rhythm and intact distal pulses.  No murmur, rubs or gallops.   Pulmonary/Chest: Effort normal and breath sounds normal. No respiratory distress. No wheezes, rhonchi or rales    A/P:    Encounter Diagnoses   Name Primary?    Overweight (BMI 25.0-29.9) Yes    Prediabetes      1. Overweight (BMI 25.0-29.9)    - Tirzepatide-Weight Management (ZEPBOUND) 2.5 MG/0.5ML Subcutaneous Solution Auto-injector; Inject 2.5 mg into the skin once a week for 4 doses.  Dispense: 2 mL; Refill: 0    2. Prediabetes    - Tirzepatide-Weight Management (ZEPBOUND) 2.5 MG/0.5ML Subcutaneous Solution Auto-injector; Inject 2.5 mg into the skin once a week for 4 doses.  Dispense: 2 mL; Refill: 0     - Counseled on weight loss methods with diet and exercise: recommended that he increase exercise to 300 minutes of aerobic exercise per week  - Recommended following a mediterranean diet.     I have started the patient on tirzepatide 2.5 mg weekly for 4 weeks.  After that follow-up in 1 month to titrate dosing.  We did discuss potential side effects of Zepbound and for patient to review blackbox warnings on the medication.  Prior Auth may need to be completed.  She has comorbidities of hypertension, sleep apnea, hyperlipidemia, fatty liver.   Discussed the following   Average sustained weight loss in long-term studies w/lifestyle interventions alone is 10-15lb  Importance of long-term maintenance tx in weight loss  Use non-food self-rewards to reinforce behavior changes  Elicit support from others; identify saboteurs  Pt's desired target weight is to lose 20 lbs  3. Diet interventions: moderate (500 kCal/d) deficit diet    Risks of dieting were reviewed, including fatigue, temporary hair loss, gallstone formation, gout, and with very low calorie diets, electrolyte  abnormalities, nutrient inadequacies, and loss of lean body mass.  Proper food choices reviewed    4. Exercise intervention:    Informal measures, e.g. taking stairs instead of elevator:  Working with a , joining an exercise program at the gym.   300 mins of weekly aerobic exercise + strength training    Patient to keep a weight log that we will review at follow up.        No orders of the defined types were placed in this encounter.      Meds & Refills for this Visit:  Requested Prescriptions     Signed Prescriptions Disp Refills    Tirzepatide-Weight Management (ZEPBOUND) 2.5 MG/0.5ML Subcutaneous Solution Auto-injector 2 mL 0     Sig: Inject 2.5 mg into the skin once a week for 4 doses.       Imaging & Consults:  None      Return in about 4 weeks (around 5/28/2025).    There are no Patient Instructions on file for this visit.    All questions were answered and the patient understands the plan.     Argelia Trevino, DO        This note was prepared using Dragon Medical voice recognition dictation software. As a result errors may occur. When identified these errors have been corrected. While every attempt is made to correct errors during dictation discrepancies may still exist.

## 2025-05-01 ENCOUNTER — TELEPHONE (OUTPATIENT)
Dept: FAMILY MEDICINE CLINIC | Facility: CLINIC | Age: 30
End: 2025-05-01

## 2025-05-01 NOTE — TELEPHONE ENCOUNTER
Received incoming fax requesting a Prior Authorization.  Zepbound.    Key [id number]  Placed fax in MA folder.

## 2025-05-02 ENCOUNTER — PATIENT MESSAGE (OUTPATIENT)
Dept: FAMILY MEDICINE CLINIC | Facility: CLINIC | Age: 30
End: 2025-05-02

## 2025-05-02 DIAGNOSIS — R73.03 PREDIABETES: ICD-10-CM

## 2025-05-02 DIAGNOSIS — E66.3 OVERWEIGHT (BMI 25.0-29.9): ICD-10-CM

## 2025-05-02 RX ORDER — TIRZEPATIDE 2.5 MG/.5ML
2.5 INJECTION, SOLUTION SUBCUTANEOUS WEEKLY
Qty: 2 ML | Refills: 0 | Status: SHIPPED | OUTPATIENT
Start: 2025-05-02 | End: 2025-05-24

## 2025-05-02 NOTE — TELEPHONE ENCOUNTER
Close reason: Product not covered by this plan. Prior Authorization not available.  Payer: [employer] [facility] Case ID: [id number]    [phone]  Note from payer: Prior Authorization Not Available - This product is excluded from this benefit plan.

## 2025-05-05 ENCOUNTER — PATIENT MESSAGE (OUTPATIENT)
Dept: FAMILY MEDICINE CLINIC | Facility: CLINIC | Age: 30
End: 2025-05-05

## 2025-05-06 ENCOUNTER — TELEPHONE (OUTPATIENT)
Dept: FAMILY MEDICINE CLINIC | Facility: CLINIC | Age: 30
End: 2025-05-06

## 2025-05-06 ENCOUNTER — LAB ENCOUNTER (OUTPATIENT)
Dept: LAB | Age: 30
End: 2025-05-06
Attending: FAMILY MEDICINE
Payer: COMMERCIAL

## 2025-05-06 DIAGNOSIS — R73.09 ELEVATED GLUCOSE: ICD-10-CM

## 2025-05-06 DIAGNOSIS — E87.1 LOW SODIUM LEVELS: ICD-10-CM

## 2025-05-06 DIAGNOSIS — Z00.00 LABORATORY EXAMINATION ORDERED AS PART OF A ROUTINE GENERAL MEDICAL EXAMINATION: ICD-10-CM

## 2025-05-06 LAB
ALBUMIN SERPL-MCNC: 4.8 G/DL (ref 3.2–4.8)
ALBUMIN/GLOB SERPL: 2.1 {RATIO} (ref 1–2)
ALP LIVER SERPL-CCNC: 61 U/L (ref 37–98)
ALT SERPL-CCNC: 21 U/L (ref 10–49)
ANION GAP SERPL CALC-SCNC: 8 MMOL/L (ref 0–18)
AST SERPL-CCNC: 22 U/L (ref ?–34)
BASOPHILS # BLD AUTO: 0.02 X10(3) UL (ref 0–0.2)
BASOPHILS NFR BLD AUTO: 0.5 %
BILIRUB SERPL-MCNC: 0.3 MG/DL (ref 0.3–1.2)
BUN BLD-MCNC: 9 MG/DL (ref 9–23)
CALCIUM BLD-MCNC: 9.5 MG/DL (ref 8.7–10.6)
CHLORIDE SERPL-SCNC: 104 MMOL/L (ref 98–112)
CHOLEST SERPL-MCNC: 152 MG/DL (ref ?–200)
CO2 SERPL-SCNC: 26 MMOL/L (ref 21–32)
CREAT BLD-MCNC: 0.83 MG/DL (ref 0.55–1.02)
EGFRCR SERPLBLD CKD-EPI 2021: 97 ML/MIN/1.73M2 (ref 60–?)
EOSINOPHIL # BLD AUTO: 0.05 X10(3) UL (ref 0–0.7)
EOSINOPHIL NFR BLD AUTO: 1.3 %
ERYTHROCYTE [DISTWIDTH] IN BLOOD BY AUTOMATED COUNT: 12.4 %
EST. AVERAGE GLUCOSE BLD GHB EST-MCNC: 111 MG/DL (ref 68–126)
FASTING PATIENT LIPID ANSWER: YES
FASTING STATUS PATIENT QL REPORTED: YES
GLOBULIN PLAS-MCNC: 2.3 G/DL (ref 2–3.5)
GLUCOSE BLD-MCNC: 100 MG/DL (ref 70–99)
HBA1C MFR BLD: 5.5 % (ref ?–5.7)
HCT VFR BLD AUTO: 40.1 % (ref 35–48)
HDLC SERPL-MCNC: 43 MG/DL (ref 40–59)
HGB BLD-MCNC: 13.8 G/DL (ref 12–16)
IMM GRANULOCYTES # BLD AUTO: 0.01 X10(3) UL (ref 0–1)
IMM GRANULOCYTES NFR BLD: 0.3 %
LDLC SERPL CALC-MCNC: 95 MG/DL (ref ?–100)
LYMPHOCYTES # BLD AUTO: 1.55 X10(3) UL (ref 1–4)
LYMPHOCYTES NFR BLD AUTO: 40.7 %
MCH RBC QN AUTO: 28.9 PG (ref 26–34)
MCHC RBC AUTO-ENTMCNC: 34.4 G/DL (ref 31–37)
MCV RBC AUTO: 83.9 FL (ref 80–100)
MONOCYTES # BLD AUTO: 0.35 X10(3) UL (ref 0.1–1)
MONOCYTES NFR BLD AUTO: 9.2 %
NEUTROPHILS # BLD AUTO: 1.83 X10 (3) UL (ref 1.5–7.7)
NEUTROPHILS # BLD AUTO: 1.83 X10(3) UL (ref 1.5–7.7)
NEUTROPHILS NFR BLD AUTO: 48 %
NONHDLC SERPL-MCNC: 109 MG/DL (ref ?–130)
OSMOLALITY SERPL CALC.SUM OF ELEC: 285 MOSM/KG (ref 275–295)
PLATELET # BLD AUTO: 214 10(3)UL (ref 150–450)
POTASSIUM SERPL-SCNC: 4.4 MMOL/L (ref 3.5–5.1)
PROT SERPL-MCNC: 7.1 G/DL (ref 5.7–8.2)
RBC # BLD AUTO: 4.78 X10(6)UL (ref 3.8–5.3)
SODIUM SERPL-SCNC: 138 MMOL/L (ref 136–145)
TRIGL SERPL-MCNC: 71 MG/DL (ref 30–149)
TSI SER-ACNC: 1.81 UIU/ML (ref 0.55–4.78)
VLDLC SERPL CALC-MCNC: 12 MG/DL (ref 0–30)
WBC # BLD AUTO: 3.8 X10(3) UL (ref 4–11)

## 2025-05-06 PROCEDURE — 36415 COLL VENOUS BLD VENIPUNCTURE: CPT

## 2025-05-06 PROCEDURE — 84443 ASSAY THYROID STIM HORMONE: CPT

## 2025-05-06 PROCEDURE — 85025 COMPLETE CBC W/AUTO DIFF WBC: CPT

## 2025-05-06 PROCEDURE — 80053 COMPREHEN METABOLIC PANEL: CPT

## 2025-05-06 PROCEDURE — 80061 LIPID PANEL: CPT

## 2025-05-06 PROCEDURE — 83036 HEMOGLOBIN GLYCOSYLATED A1C: CPT

## 2025-05-07 DIAGNOSIS — L70.0 ACNE VULGARIS: ICD-10-CM

## 2025-05-07 DIAGNOSIS — F41.1 GAD (GENERALIZED ANXIETY DISORDER): ICD-10-CM

## 2025-05-07 NOTE — TELEPHONE ENCOUNTER
Patient completed lab work 5/6/2025.    Patient reviewed providers result and recommendations.   Written by Argelia Trevino DO on 5/6/2025 11:01 PM CDT  Seen by patient Lynda Sin on 5/7/2025 11:03 AM

## 2025-05-08 RX ORDER — SPIRONOLACTONE 100 MG/1
100 TABLET, FILM COATED ORAL DAILY
Qty: 30 TABLET | Refills: 0 | Status: SHIPPED | OUTPATIENT
Start: 2025-05-08

## 2025-05-08 NOTE — TELEPHONE ENCOUNTER
Requesting Clonazepam 0.5mg  Last Rx'd 3/30/25 #60 with 0 refills    Requesting Spironolactone 100mg  Last Rx'd 3/21/25 #30 with 0 refills  Last labs: 5/6/25  Hypertension Medications Protocol [id number]/07/2025 07:26 PM   Protocol Details   CMP or BMP in past 12 months    Last BP reading less than 140/90    In person appointment or virtual visit in the past 12 mos or appointment in next 3 mos    EGFRCR or GFRNAA > 50    Medication is active on med list   Rx sent to pharmacy per protocol    Last OV: 4/30/25  RTC: 4 weeks    No future appointments.    Per [place] , Clonazepam last dispensed 3/31/325 #60    Controlled med:  Rx pended and routed for approval/denial

## 2025-05-10 RX ORDER — CLONAZEPAM 0.5 MG/1
0.5 TABLET ORAL 2 TIMES DAILY PRN
Qty: 60 TABLET | Refills: 0 | Status: SHIPPED | OUTPATIENT
Start: 2025-05-10

## 2025-05-19 ENCOUNTER — PATIENT MESSAGE (OUTPATIENT)
Dept: FAMILY MEDICINE CLINIC | Facility: CLINIC | Age: 30
End: 2025-05-19

## 2025-05-19 ENCOUNTER — E-VISIT (OUTPATIENT)
Dept: TELEHEALTH | Age: 30
End: 2025-05-19
Payer: COMMERCIAL

## 2025-05-19 DIAGNOSIS — R73.03 PREDIABETES: ICD-10-CM

## 2025-05-19 DIAGNOSIS — E66.3 OVERWEIGHT (BMI 25.0-29.9): ICD-10-CM

## 2025-05-19 DIAGNOSIS — R21 RASH: Primary | ICD-10-CM

## 2025-05-20 DIAGNOSIS — E66.3 OVERWEIGHT (BMI 25.0-29.9): ICD-10-CM

## 2025-05-20 DIAGNOSIS — R73.03 PREDIABETES: ICD-10-CM

## 2025-05-23 RX ORDER — TIRZEPATIDE 2.5 MG/.5ML
INJECTION, SOLUTION SUBCUTANEOUS
Qty: 2 ML | Refills: 0 | OUTPATIENT
Start: 2025-05-23

## 2025-05-26 NOTE — PROGRESS NOTES
HPI:  Lynda Sin is a 29 year old female who presents for an evisit.  See e-visit questionnaire submission and messages.    Current Medications[1]  Past Medical History[2]  Past Surgical History[3]    Short Social Hx on File[4]       No results found for this or any previous visit (from the past 24 hours).    ASSESSMENT AND PLAN:      ASSESSMENT:   Encounter Diagnosis   Name Primary?    Rash Yes       PLAN: Meds as below.  See patient Instructions and visit messages.   -Total of 5 minutes spent with patient. ODVV or in person exam advised.    Meds & Refills for this Visit:  Requested Prescriptions      No prescriptions requested or ordered in this encounter       Risks, benefits, and side effects of medication explained and discussed.    There are no Patient Instructions on file for this visit.    The patient indicates understanding of these issues and agrees to the plan.  See attached patient references.  The patient is asked to return if sx's persist or worsen.    Lynda Sin understands evisit evaluation is not a substitute for face-to-face examination or emergency care. Patient advised to go to ER or call 911 for worsening symptoms or acute distress.         [1]   Current Outpatient Medications   Medication Sig Dispense Refill    CLONAZEPAM 0.5 MG Oral Tab Take 1 tablet (0.5 mg total) by mouth 2 (two) times daily as needed for Anxiety. 60 tablet 0    SPIRONOLACTONE 100 MG Oral Tab Take 1 tablet (100 mg total) by mouth daily. 30 tablet 0    hydrOXYzine 50 MG Oral Tab Take 1 tablet (50 mg total) by mouth nightly as needed for Itching. 30 tablet 2    PARoxetine 30 MG Oral Tab Take 1 tablet (30 mg total) by mouth every morning. 90 tablet 1    L-Theanine 200 MG Oral Cap Take by mouth.      Prenatal Multivit-Min-Fe-FA (PRE- FORMULA OR) Take by mouth.      Docosahexaenoic Acid (DHA COMPLETE OR) Take by mouth.      cyanocobalamin 500 MCG Oral Tab Take 1 tablet (500 mcg total) by mouth daily.      vitamin A  3 MG (83268 UT) Oral Cap Take 1 capsule (10,000 Units total) by mouth daily.      Lactobacillus-Inulin (CULTURELLE ADULT ULT BALANCE OR) Take 1 tablet by mouth daily.      Biotin 66689 MCG Oral Tab Take 1 tablet by mouth daily.      Vitamin D3, Cholecalciferol, 125 MCG (5000 UT) Oral Cap Take 1 capsule (5,000 Units total) by mouth daily.     [2]   Past Medical History:   Allergic rhinitis    Anxiety    Asthma (HCC)    KRYSTIN III (cervical intraepithelial neoplasia grade III) with severe dysplasia    Exercise-induced asthma (HCC)    H/O LEEP    HGSIL (high grade squamous intraepithelial lesion) on Pap smear of cervix    will see family friend who is a gyne    High grade squamous intraepithelial lesion (HGSIL), grade 3 KRYSTIN, on biopsy of cervix   [3]   Past Surgical History:  Procedure Laterality Date    Hernia surgery  1996   [4]   Social History  Socioeconomic History    Marital status:    Occupational History    Occupation: EMS   Tobacco Use    Smoking status: Never    Smokeless tobacco: Never   Vaping Use    Vaping status: Never Used   Substance and Sexual Activity    Alcohol use: No    Drug use: No    Sexual activity: Yes     Partners: Male   Other Topics Concern    Caffeine Concern No    Exercise No    Seat Belt No    Special Diet No    Stress Concern No    Weight Concern Yes     Comment: Weight loss

## 2025-05-29 RX ORDER — TIRZEPATIDE 2.5 MG/.5ML
2.5 INJECTION, SOLUTION SUBCUTANEOUS WEEKLY
Qty: 2 ML | Refills: 0 | OUTPATIENT
Start: 2025-05-29 | End: 2025-06-20

## 2025-05-30 ENCOUNTER — TELEMEDICINE (OUTPATIENT)
Dept: FAMILY MEDICINE CLINIC | Facility: CLINIC | Age: 30
End: 2025-05-30
Payer: COMMERCIAL

## 2025-05-30 VITALS — WEIGHT: 145 LBS | BODY MASS INDEX: 24.75 KG/M2 | HEIGHT: 64 IN

## 2025-05-30 DIAGNOSIS — R73.03 PREDIABETES: ICD-10-CM

## 2025-05-30 DIAGNOSIS — E66.3 OVERWEIGHT (BMI 25.0-29.9): Primary | ICD-10-CM

## 2025-05-30 PROCEDURE — 98004 SYNCH AUDIO-VIDEO EST SF 10: CPT | Performed by: FAMILY MEDICINE

## 2025-05-30 RX ORDER — TIRZEPATIDE 5 MG/.5ML
5 INJECTION, SOLUTION SUBCUTANEOUS WEEKLY
Qty: 6 ML | Refills: 0 | Status: SHIPPED | OUTPATIENT
Start: 2025-05-30 | End: 2025-08-16

## 2025-05-30 NOTE — PROGRESS NOTES
Subjective    This visit is conducted using Telemedicine with live, interactive video and audio.    Chief Complaint:  Chief Complaint   Patient presents with    Medication Follow-Up         The patient confirmed knowledge of the limitations of the use of telemedicine were verbally confirmed by the provider.  Verification of patient identity was established.  Patient understands and accepts financial responsibility for any deductible, co-insurance and/or co-pays associated with this service.      HPI:    Patient is on zepbound for 1 month.   She lost 15 lbs.   She was placed on 2.5mg of zepbound x 1 month.   She feels a lot better.   Reports weight has immediately dropped off.   She is eating healthy/exercsiing  She isdoing walks couple times a day 5 miles a day.  Reports decreased appetite.   She has hx of asthma0 recently recovered from sinus infection.   Wt Readings from Last 6 Encounters:   05/30/25 145 lb (65.8 kg)   04/30/25 159 lb (72.1 kg)   10/21/24 159 lb 12.8 oz (72.5 kg)   09/30/24 155 lb 3.3 oz (70.4 kg)   09/23/24 160 lb (72.6 kg)   03/29/24 145 lb (65.8 kg)       She is not currently pregnancy planning.     Review of Systems   Constitutional: Negative for fever, chills and fatigue. No distress.  Respiratory: Negative for cough, chest tightness, shortness of breath and wheezing.    Cardiovascular: Negative for chest pain, palpitations and leg swelling.   Gastrointestinal: Negative for nausea, vomiting, abdominal pain, diarrhea, blood in stool and abdominal distention.   Genitourinary: Negative for dysuria, hematuria and difficulty urinating.     HISTORY:  Past Medical History[1]   Past Surgical History[2]   Family History[3]   Short Social Hx on File[4]           Objective    Physical Exam:  GEN: patient appears well  Respiratory effort: normal , No pursed-lip breathing, speaking in complete sentences  Neuro: Alert/oriented x3, speech is fluent, face symmetric  Psych: Mood is stable, Affect  appropriate    Assessment/Plan:    1. Overweight (BMI 25.0-29.9)  Patient is responding well to medication and has been getting back to her baseline weight  Rx increased to 5mg per week.   Discussed medication dosage, usage, side effects, and goals of treatment in detail.  Instructed patient to read medication insert for all side effects and contraindications before starting medication.   Instructed patient to review risks of medications and interactions with other medications with the pharmacist.   F/u 3 months   - Tirzepatide-Weight Management (ZEPBOUND) 5 MG/0.5ML Subcutaneous Solution; Inject 5 mg into the skin once a week for 12 doses.  Dispense: 6 mL; Refill: 0    2. Prediabetes  - Tirzepatide-Weight Management (ZEPBOUND) 5 MG/0.5ML Subcutaneous Solution; Inject 5 mg into the skin once a week for 12 doses.  Dispense: 6 mL; Refill: 0      Diagnostic rationale, follow up instructions, and strict precautions/indications for emergent direct evaluation were discussed with the patient. The patient agrees with the plan, and understands to follow up with their primary care physician or other healthcare provider within 48-72 hours for reevaluation for persistent or worsening symptoms.    Patient understands phone evaluation is not a substitute for face-to-face examination or emergency care. Patient advised to go to ER or call 911 for worsening symptoms or acute distress.         Argelia Trevino DO         [1]   Past Medical History:   Allergic rhinitis    Anxiety    Asthma (HCC)    KRYSTIN III (cervical intraepithelial neoplasia grade III) with severe dysplasia    Exercise-induced asthma (HCC)    H/O LEEP    HGSIL (high grade squamous intraepithelial lesion) on Pap smear of cervix    will see family friend who is a gyne    High grade squamous intraepithelial lesion (HGSIL), grade 3 KRYSTIN, on biopsy of cervix   [2]   Past Surgical History:  Procedure Laterality Date    Hernia surgery  1996   [3]   Family History  Problem  Relation Age of Onset    Anxiety Mother    [4]   Social History  Socioeconomic History    Marital status:    Occupational History    Occupation: EMS   Tobacco Use    Smoking status: Never    Smokeless tobacco: Never   Vaping Use    Vaping status: Never Used   Substance and Sexual Activity    Alcohol use: No    Drug use: No    Sexual activity: Yes     Partners: Male   Other Topics Concern    Caffeine Concern No    Exercise No    Seat Belt No    Special Diet No    Stress Concern No    Weight Concern Yes     Comment: Weight loss

## 2025-05-31 ENCOUNTER — HOSPITAL ENCOUNTER (OUTPATIENT)
Dept: GENERAL RADIOLOGY | Age: 30
Discharge: HOME | End: 2025-05-31
Attending: NURSE PRACTITIONER
Payer: COMMERCIAL

## 2025-05-31 DIAGNOSIS — R06.02 MILD SHORTNESS OF BREATH: ICD-10-CM

## 2025-05-31 PROCEDURE — 71046 X-RAY EXAM CHEST 2 VIEWS: CPT | Performed by: NURSE PRACTITIONER

## 2025-06-02 DIAGNOSIS — L70.0 ACNE VULGARIS: ICD-10-CM

## 2025-06-03 RX ORDER — SPIRONOLACTONE 100 MG/1
100 TABLET, FILM COATED ORAL DAILY
Qty: 90 TABLET | Refills: 3 | Status: SHIPPED | OUTPATIENT
Start: 2025-06-03

## 2025-06-03 NOTE — TELEPHONE ENCOUNTER
Refill sent. Please contact patient to schedule her annual exam and pap. Looks like she also sent an email to Emelyn Nuñez a couple weeks ago asking about birth control which can also address at that appt if needed.     Gurvinder Barcenas, DO  Family Medicine comfortable appearance/cooperative/improvement verbalized/family/SO at bedside/resting/sleeping

## 2025-06-03 NOTE — TELEPHONE ENCOUNTER
Requested Prescriptions     Pending Prescriptions Disp Refills    SPIRONOLACTONE 100 MG Oral Tab [Pharmacy Med Name: Spironolactone 100 Mg Tab Nort] 30 tablet 0     Sig: Take 1 tablet (100 mg total) by mouth daily.       LOV: 4/30/25  RTC:   Last Relevant Labs: 5/6/25  Filled: 5/8/25 #30 with 0 refills    No future appointments.

## 2025-06-24 DIAGNOSIS — F41.1 GAD (GENERALIZED ANXIETY DISORDER): ICD-10-CM

## 2025-06-24 NOTE — TELEPHONE ENCOUNTER
Requesting Clonazepam 0.5mg  Last OV: 5/30/25 Telemedicine  RTC: not noted  Last Rx'd 5/10/25 #60 with 0 refills    No future appointments.    Per [place] , last dispensed 5/11/25 #60    Controlled med:  Rx pended and routed for approval/denial

## 2025-06-25 RX ORDER — CLONAZEPAM 0.5 MG/1
0.5 TABLET ORAL 2 TIMES DAILY PRN
Qty: 60 TABLET | Refills: 0 | Status: SHIPPED | OUTPATIENT
Start: 2025-06-25

## 2025-07-03 ENCOUNTER — PATIENT MESSAGE (OUTPATIENT)
Dept: FAMILY MEDICINE CLINIC | Facility: CLINIC | Age: 30
End: 2025-07-03

## 2025-07-03 DIAGNOSIS — E66.3 OVERWEIGHT (BMI 25.0-29.9): ICD-10-CM

## 2025-07-03 DIAGNOSIS — R73.03 PREDIABETES: ICD-10-CM

## 2025-07-03 NOTE — TELEPHONE ENCOUNTER
Patient requesting to go down to Zepbound 2.5mg.    Telemed 5/30/2025 for medication follow up.   1. Overweight (BMI 25.0-29.9)  Patient is responding well to medication and has been getting back to her baseline weight  Rx increased to 5mg per week.        Disp Refills Start End    Tirzepatide-Weight Management (ZEPBOUND) 5 MG/0.5ML Subcutaneous Solution 6 mL 0 5/30/2025 8/16/2025    Sig - Route: Inject 5 mg into the skin once a week for 12 doses. - Subcutaneous    Sent to pharmacy as: Zepbound 5 MG/0.5ML Subcutaneous Solution (Tirzepatide-Weight Management)    E-Prescribing Status: Receipt confirmed by pharmacy (5/30/2025  5:44 PM CDT)      [facility] - [place], [place] - [facility] -[phone], [phone]     Last dispensed is not on [place] .     No future appointments.    Okay to reorder Zepbound 2.5mg weekly?

## 2025-07-04 RX ORDER — TIRZEPATIDE 2.5 MG/.5ML
2.5 INJECTION, SOLUTION SUBCUTANEOUS WEEKLY
Qty: 2 ML | Refills: 0 | Status: SHIPPED | OUTPATIENT
Start: 2025-07-04 | End: 2025-07-26

## 2025-07-23 ENCOUNTER — PATIENT MESSAGE (OUTPATIENT)
Dept: FAMILY MEDICINE CLINIC | Facility: CLINIC | Age: 30
End: 2025-07-23

## 2025-07-23 DIAGNOSIS — R73.03 PREDIABETES: ICD-10-CM

## 2025-07-23 DIAGNOSIS — E66.3 OVERWEIGHT (BMI 25.0-29.9): ICD-10-CM

## 2025-07-23 RX ORDER — TIRZEPATIDE 5 MG/.5ML
5 INJECTION, SOLUTION SUBCUTANEOUS WEEKLY
Qty: 2 ML | Refills: 0 | Status: SHIPPED | OUTPATIENT
Start: 2025-07-23 | End: 2025-08-14

## 2025-07-23 NOTE — TELEPHONE ENCOUNTER
Telemed 5/30/2025  1. Overweight (BMI 25.0-29.9)  Patient is responding well to medication and has been getting back to her baseline weight  Rx increased to 5mg per week.     RTC follow up in 3 months    No future appointments.    See patient message from 7/3/2025.   Zepbound 2.5mg was sent to [facility].     Patient is asking to go back to Zepbound 5mg and have a prescription sent to [facility].    Rx pended.   Routed to the provider for review.

## 2025-07-24 ENCOUNTER — APPOINTMENT (OUTPATIENT)
Dept: GENERAL RADIOLOGY | Age: 30
End: 2025-07-24
Attending: NURSE PRACTITIONER

## 2025-07-24 ENCOUNTER — HOSPITAL ENCOUNTER (EMERGENCY)
Age: 30
Discharge: HOME | End: 2025-07-24

## 2025-07-24 VITALS
BODY MASS INDEX: 22.2 KG/M2 | RESPIRATION RATE: 17 BRPM | OXYGEN SATURATION: 98 % | SYSTOLIC BLOOD PRESSURE: 108 MMHG | TEMPERATURE: 98 F | HEIGHT: 64 IN | DIASTOLIC BLOOD PRESSURE: 83 MMHG | WEIGHT: 130 LBS | HEART RATE: 78 BPM

## 2025-07-24 DIAGNOSIS — S50.11XA CONTUSION OF RIGHT FOREARM, INITIAL ENCOUNTER: ICD-10-CM
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
## 2025-07-24 DIAGNOSIS — S20.211A CONTUSION OF RIB ON RIGHT SIDE, INITIAL ENCOUNTER: ICD-10-CM

## 2025-07-24 DIAGNOSIS — M54.9 BACK PAIN WITHOUT RADIATION: ICD-10-CM

## 2025-07-24 LAB — B-HCG UR QL: NEGATIVE

## 2025-07-24 PROCEDURE — 72110 X-RAY EXAM L-2 SPINE 4/>VWS: CPT | Performed by: NURSE PRACTITIONER

## 2025-07-24 PROCEDURE — 99283 EMERGENCY DEPT VISIT LOW MDM: CPT

## 2025-07-24 PROCEDURE — 99284 EMERGENCY DEPT VISIT MOD MDM: CPT

## 2025-07-24 PROCEDURE — 71101 X-RAY EXAM UNILAT RIBS/CHEST: CPT | Performed by: NURSE PRACTITIONER

## 2025-07-24 PROCEDURE — 73090 X-RAY EXAM OF FOREARM: CPT | Performed by: NURSE PRACTITIONER

## 2025-07-24 PROCEDURE — 81025 URINE PREGNANCY TEST: CPT

## 2025-07-24 RX ORDER — IBUPROFEN 600 MG/1
600 TABLET, FILM COATED ORAL EVERY 8 HOURS PRN
Qty: 30 TABLET | Refills: 0 | Status: SHIPPED | OUTPATIENT
Start: 2025-07-24 | End: 2025-07-31

## 2025-07-24 RX ORDER — LIDOCAINE 50 MG/G
1 PATCH TOPICAL EVERY 24 HOURS
Qty: 10 PATCH | Refills: 0 | Status: SHIPPED | OUTPATIENT
Start: 2025-07-24 | End: 2025-08-03

## 2025-07-24 RX ORDER — CYCLOBENZAPRINE HCL 10 MG
10 TABLET ORAL 3 TIMES DAILY PRN
Qty: 20 TABLET | Refills: 0 | Status: SHIPPED | OUTPATIENT
Start: 2025-07-24 | End: 2025-07-31
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
## 2025-07-31 DIAGNOSIS — F41.1 GAD (GENERALIZED ANXIETY DISORDER): ICD-10-CM

## 2025-08-01 RX ORDER — CLONAZEPAM 0.5 MG/1
0.5 TABLET ORAL 2 TIMES DAILY PRN
Qty: 60 TABLET | Refills: 0 | Status: SHIPPED | OUTPATIENT
Start: 2025-08-01

## 2025-08-26 DIAGNOSIS — F41.1 GAD (GENERALIZED ANXIETY DISORDER): ICD-10-CM

## 2025-08-27 RX ORDER — CLONAZEPAM 0.5 MG/1
0.5 TABLET ORAL 2 TIMES DAILY PRN
Qty: 60 TABLET | Refills: 0 | Status: SHIPPED | OUTPATIENT
Start: 2025-08-27

## 2025-08-27 RX ORDER — HYDROXYZINE HYDROCHLORIDE 50 MG/1
50 TABLET, FILM COATED ORAL NIGHTLY PRN
Qty: 30 TABLET | Refills: 0 | Status: SHIPPED | OUTPATIENT
Start: 2025-08-27

## 2025-08-27 RX ORDER — PAROXETINE 30 MG/1
30 TABLET, FILM COATED ORAL EVERY MORNING
Qty: 90 TABLET | Refills: 0 | Status: SHIPPED | OUTPATIENT
Start: 2025-08-27

## (undated) DEVICE — PROCTO SWABS: Brand: DEROYAL

## (undated) DEVICE — SCD SLEEVE KNEE HI BLEND

## (undated) DEVICE — GYN CDS: Brand: MEDLINE INDUSTRIES, INC.

## (undated) DEVICE — SPECIMEN CONTAINER,POSITIVE SEAL INDICATOR, OR PACKAGED: Brand: PRECISION

## (undated) DEVICE — CAUTERY PENCIL

## (undated) DEVICE — SOL  .9 1000ML BTL

## (undated) DEVICE — MEGADYNE ELECTRODE ADULT PT RT

## (undated) DEVICE — ELECTRODE LOOP BLUE 20X15

## (undated) NOTE — LETTER
Date: 8/24/2018      Patient Name: Tequila Sanchez          To Whom it may concern: This letter has been written at the patient's request. The above patient was seen at the [facility] for treatment of a medical condition.     This loulou

## (undated) NOTE — LETTER
ASTHMA ACTION PLAN for Rehana Cabrera     : 1995     Date: 2023  Provider:  Mouna Horn DO  Phone for doctor or clinic: [facility], [address] [facility] , [address] [place] [id number] [phone]    ACT Score: 24      You can use the colors of a traffic light to help learn about your asthma medicines. 1. Green - Go! % of Personal Best Peak Flow Use controller medicine. Breathing is good  No cough or wheeze  Can work and play Medicine How much to take When to take it    Patient does not take any maintenance medications. 2. Yellow - Caution. 50-79% Personal Best Peak  Flow. Use reliever medicine to keep an asthma attack from getting bad. Cough  Wheezing  Tight Chest  Wake up at night Medicine How much to take When to take it    Albuterol inhaler,  2 puffs every four hours as needed. Additional instructions         3. Red - Stop! Danger!  <50% Personal Best Peak  Flow. Take these medications until  Get help from a doctor   Medicine not helping  Breathing is hard and fast  Nose opens wide  Can't walk  Ribs show  Can't talk well Medicine How much to take When to take it    Call 911 or go to the nearest ER. Call your doctor. Additional Instructions If your symptoms do not improve and you cannot contact your doctor, [facility] or call 911 immediately! [x] Asthma Action Plan reviewed with patient (and caregiver if necessary) and a copy of the plan was given to the patient/caregiver. [] Asthma Action Plan reviewed with patient (and caregiver if necessary) on the phone and mailed copy to patient or submitted via [phone]      Signatures:  Provider  Mouna Horn DO   Patient Caretaker

## (undated) NOTE — LETTER
Date: 6/8/2023    Patient Name: Teodora Sin          To Whom it may concern:          This patient should be excused from attending work/school on 6/8/23        Sincerely,    Merline Negro, DO

## (undated) NOTE — LETTER
Date: 10/28/2020    Patient Name: Elsy Bailey          To Whom it may concern:     This letter has been written at the patient's request. The above patient has had adverse/allergic reaction to the influenza vaccine in the past.        Sincerely,      Adolfo

## (undated) NOTE — LETTER
ASTHMA ACTION PLAN for Lynda Sin     : 1995     Date: 10/21/2024  Provider:  KEVIN Meraz  Phone for doctor or clinic: [facility], [address] [place] [address] [address]  [facility] [id number] [phone]    ACT Score: 25      You can use the colors of a traffic light to help learn about your asthma medicines.      1. Green - Go! % of Personal Best Peak Flow Use controller medicine.   Breathing is good  No cough or wheeze  Can work and play Medicine How much to take When to take it    No maintenance medications      2. Yellow - Caution. 50-79% Personal Best Peak  Flow.  Use reliever medicine to keep an asthma attack from getting bad.   Cough  Wheezing  Tight Chest  Wake up at night Medicine How much to take When to take it    Albuterol inhaler,  2 puffs every four hours as needed.         Additional instructions         3. Red - Stop! Danger!  <50% Personal Best Peak  Flow. Take these medications until  Get help from a doctor   Medicine not helping  Breathing is hard and fast  Nose opens wide  Can't walk  Ribs show  Can't talk well Medicine How much to take When to take it    Albuterol Inhaler 2 puffs every 20 minutes up to 3 times in a row for severe symptoms on the way to the Emergency Room.       Additional Instructions If your symptoms do not improve and you cannot contact your doctor, [facility] or call 911 immediately!     [] Asthma Action Plan reviewed with patient (and caregiver if necessary) and a copy of the plan was given to the patient/caregiver.   [x] Asthma Action Plan reviewed with patient (and caregiver if necessary) on the phone and mailed copy to patient or submitted via [employer].     Signatures:  Provider  KEVIN Meraz   Patient Caretaker

## (undated) NOTE — LETTER
Date: 7/24/2018    Patient Name: Jimbo Zhu          To Whom it may concern: This letter has been written at the patient's request. The above patient was seen at the [facility] for treatment of a medical condition.     This patient ángelu

## (undated) NOTE — LETTER
Date: 6/2/2023    Patient Name: Yuni Sin          To Whom it may concern: This letter has been written at the patient's request. The above patient was evaluated through telemedicine encounter for treatment of a medical condition. This patient should be excused from attending work on Friday 6/2/23. The patient may return to work on next scheduled shift with no limitations.         Sincerely,    DWAYNE Judge, PA-C  Physician Assistant  [facility]  89.

## (undated) NOTE — LETTER
Date: 3/9/2021    Patient Name: Donnie Parham          To Whom it may concern: This letter has been written at the patient's request. The above patient had a virtual encounter from a provider with [facility] for evaluation of a medical condition.

## (undated) NOTE — LETTER
08/14/18        Lynda Thompson  [address] [facility]      Dear Dante Gomez,    Our records indicate that you have outstanding lab work and or testing that was ordered for you and has not yet been completed:          CBC W Differential [address]

## (undated) NOTE — LETTER
Date: 8/22/2018    Patient Name: Peggy Mirza          To Whom it may concern: This letter has been written at the patient's request. The above patient was seen at the [facility] for treatment of a medical condition.     This patient ángelu

## (undated) NOTE — MR AVS SNAPSHOT
[facility] [address]  [place]  [phone]               Thank you for choosing us for your health care visit with Ellie Koroma PA-C.   We are glad to serve you and happy to provide you [phone], Go to [facility] A ER Building (For example: CT scans, X rays, Ultrasound, MRI)  •Cardiac Testing in ER building Building A second floor Cardiac Testing [phone] (For example: Holter Monitor, Cardiac Stress tests,Event Monitor, or 2D Echocardio If your prescription is due for a refill, you may be due for a follow-up appointment. We cannot refill your maintenance medications at a preventative wellness visit.   To best provide you care, patients receiving maintenance medications need to be seen at Visit [facility] online at  [email or web address]

## (undated) NOTE — LETTER
Date: 9/12/2018    Patient Name: Ashlie Quinones          To Whom it may concern: The above patient was seen at the [facility] for treatment of a medical condition. This patient should be excused from attending work on 9/12 and 9/13.   Emilie La